# Patient Record
Sex: FEMALE | Race: OTHER | NOT HISPANIC OR LATINO | Employment: FULL TIME | URBAN - METROPOLITAN AREA
[De-identification: names, ages, dates, MRNs, and addresses within clinical notes are randomized per-mention and may not be internally consistent; named-entity substitution may affect disease eponyms.]

---

## 2017-07-17 ENCOUNTER — TELEPHONE (OUTPATIENT)
Dept: OBGYN | Facility: CLINIC | Age: 37
End: 2017-07-17

## 2018-04-17 ENCOUNTER — OFFICE VISIT (OUTPATIENT)
Dept: MEDICAL GROUP | Facility: MEDICAL CENTER | Age: 38
End: 2018-04-17
Payer: COMMERCIAL

## 2018-04-17 VITALS
TEMPERATURE: 97.8 F | RESPIRATION RATE: 16 BRPM | BODY MASS INDEX: 45.48 KG/M2 | WEIGHT: 273 LBS | OXYGEN SATURATION: 97 % | SYSTOLIC BLOOD PRESSURE: 122 MMHG | DIASTOLIC BLOOD PRESSURE: 72 MMHG | HEART RATE: 70 BPM | HEIGHT: 65 IN

## 2018-04-17 DIAGNOSIS — Z00.00 ROUTINE GENERAL MEDICAL EXAMINATION AT A HEALTH CARE FACILITY: ICD-10-CM

## 2018-04-17 DIAGNOSIS — E66.01 MORBID OBESITY WITH BMI OF 45.0-49.9, ADULT (HCC): ICD-10-CM

## 2018-04-17 DIAGNOSIS — Z23 NEED FOR PNEUMOCOCCAL VACCINATION: ICD-10-CM

## 2018-04-17 PROCEDURE — 90732 PPSV23 VACC 2 YRS+ SUBQ/IM: CPT | Performed by: NURSE PRACTITIONER

## 2018-04-17 PROCEDURE — 90471 IMMUNIZATION ADMIN: CPT | Performed by: NURSE PRACTITIONER

## 2018-04-17 PROCEDURE — 99395 PREV VISIT EST AGE 18-39: CPT | Mod: 25 | Performed by: NURSE PRACTITIONER

## 2018-04-17 ASSESSMENT — PATIENT HEALTH QUESTIONNAIRE - PHQ9: CLINICAL INTERPRETATION OF PHQ2 SCORE: 0

## 2018-04-17 NOTE — PROGRESS NOTES
"Subjective:      Ralf Qureshi is a 37 y.o. female who presents with Annual Exam        CC: Patient here today accompanied by her  for yearly physical.    HPI Ralf Qureshi was last seen in this office in 2016. She states she feels generally healthy and although she has history of migraine she rarely uses medicines. She states she has an appointment for her Pap smear in the next few weeks. She has no complaints today. 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 She does continue to smoke as does her . She denies drug or alcohol usage. She has no physical or mental health complaints today.        Current Outpatient Prescriptions   Medication Sig Dispense Refill   • sumatriptan (IMITREX) 50 MG Tab Take 1 Tab by mouth Once PRN for Migraine for up to 1 dose. 10 Tab 3   • ondansetron (ZOFRAN ODT) 4 MG TABLET DISPERSIBLE Take 1 Tab by mouth every 8 hours as needed for Nausea/Vomiting. 10 Tab 0     No current facility-administered medications for this visit.      Social History   Substance Use Topics   • Smoking status: Current Some Day Smoker     Packs/day: 0.25     Years: 15.00     Types: Cigarettes     Last attempt to quit: 10/8/2014   • Smokeless tobacco: Current User      Comment: cut down to 1 cigarette per day   • Alcohol use Yes      Comment: occas     Past Medical History:   Diagnosis Date   • Anemia 2/2011   • Blood transfusion 2/2011 2/2 anemia   • Headache(784.0)    • Migraine      Family History   Problem Relation Age of Onset   • Diabetes Maternal Grandmother      insulin   • Hypertension Maternal Grandmother    • Cancer Maternal Grandfather      lung cancer   • Cancer Mother      lung   • Other Father      unknown       Review of Systems   All other systems reviewed and are negative.         Objective:     /72   Pulse 70   Temp 36.6 °C (97.8 °F)   Resp 16   Ht 1.651 m (5' 5\")   Wt 123.8 kg (273 lb)   SpO2 97%   BMI 45.43 kg/m²      Physical Exam "   Constitutional: She is oriented to person, place, and time. She appears well-developed and well-nourished. No distress.   HENT:   Head: Normocephalic and atraumatic.   Right Ear: External ear normal.   Left Ear: External ear normal.   Nose: Nose normal.   Eyes: Right eye exhibits no discharge. Left eye exhibits no discharge.   Neck: Normal range of motion. Neck supple. No thyromegaly present.   Cardiovascular: Normal rate, regular rhythm and normal heart sounds.  Exam reveals no gallop and no friction rub.    No murmur heard.  Pulmonary/Chest: Effort normal and breath sounds normal. She has no wheezes. She has no rales.   Musculoskeletal: She exhibits no edema or tenderness.   Neurological: She is alert and oriented to person, place, and time. She displays normal reflexes.   Skin: Skin is warm and dry. No rash noted. She is not diaphoretic.   Psychiatric: She has a normal mood and affect. Her behavior is normal. Judgment and thought content normal.   Nursing note and vitals reviewed.              Assessment/Plan:     1. Routine general medical examination at a health care facility  It does not appear the patient has had recent routine lab work which I have ordered today. She will follow up pending results. She is to be sure to have her Pap smear done with gynecology in the near future. She was advised to quit smoking and lose weight.  - CBC WITHOUT DIFFERENTIAL; Future  - COMP METABOLIC PANEL; Future  - LIPID PROFILE; Future  - TSH; Future    2. Morbid obesity with BMI of 45.0-49.9, adult ([facility])  Patient would like to see a dietitian to discuss weight loss and she has trouble losing weight on her own.  - Patient identified as having weight management issue.  Appropriate orders and counseling given.  [facility]) Services Requested: Weight Management Program; Reason for Visit: Overweight/Obesity; Future    3. Need for pneumococcal vaccination  I have placed the below  orders and discussed them with an approved delegating provider. The MA is performing the below orders under the direction of Dr. Wayne    - PNEUMOCOCCAL POLYSACCHARIDE VACCINE 23-VALENT =>3YO SQ/IM

## 2019-01-21 ENCOUNTER — SUPERVISING PHYSICIAN REVIEW (OUTPATIENT)
Dept: MEDICAL GROUP | Facility: MEDICAL CENTER | Age: 39
End: 2019-01-21

## 2019-01-21 ENCOUNTER — OFFICE VISIT (OUTPATIENT)
Dept: MEDICAL GROUP | Facility: MEDICAL CENTER | Age: 39
End: 2019-01-21
Payer: COMMERCIAL

## 2019-01-21 VITALS
SYSTOLIC BLOOD PRESSURE: 122 MMHG | HEIGHT: 65 IN | DIASTOLIC BLOOD PRESSURE: 68 MMHG | HEART RATE: 66 BPM | OXYGEN SATURATION: 98 % | BODY MASS INDEX: 46.15 KG/M2 | WEIGHT: 277 LBS | RESPIRATION RATE: 16 BRPM

## 2019-01-21 DIAGNOSIS — R73.01 IMPAIRED FASTING GLUCOSE: ICD-10-CM

## 2019-01-21 DIAGNOSIS — Z12.2 ENCOUNTER FOR SCREENING FOR LUNG CANCER: ICD-10-CM

## 2019-01-21 DIAGNOSIS — Z72.0 TOBACCO ABUSE: ICD-10-CM

## 2019-01-21 DIAGNOSIS — Z12.39 SCREENING FOR BREAST CANCER: ICD-10-CM

## 2019-01-21 DIAGNOSIS — E78.5 DYSLIPIDEMIA: ICD-10-CM

## 2019-01-21 PROCEDURE — 99213 OFFICE O/P EST LOW 20 MIN: CPT | Performed by: NURSE PRACTITIONER

## 2019-01-21 RX ORDER — IRON 18 MG
TABLET ORAL
COMMUNITY
End: 2021-04-20

## 2019-01-21 ASSESSMENT — PATIENT HEALTH QUESTIONNAIRE - PHQ9: CLINICAL INTERPRETATION OF PHQ2 SCORE: 0

## 2019-01-21 NOTE — PROGRESS NOTES
Subjective:      Ralf Qureshi is a 38 y.o. female who presents with Results (labs)        CC: Patient here today for routine follow-up on recent lab work with elevated glucose and low HDL.    HPI Ralf Qureshi        1. Impaired fasting glucose  Patient's recent fasting blood sugar came back mildly elevated at 102.  She has no history of diabetes and her  was just recently diagnosed with type 2 diabetes.  She does eat a high carb diet and her BMI is elevated.  She also smokes cigarettes.    2. Dyslipidemia  Patient's cholesterol levels came back fairly good except for slightly low HDL at 38.  She states she does not exercise but plans on getting into an exercise program with the new year.      3. Tobacco abuse  Patient states she has been smoking cigarettes since she was a teenager.    4. Screening for breast cancer  Patient due for her first mammogram.    5. Encounter for screening for lung cancer  Patient states a family member recently  of lung cancer and since she is a long-term tobacco user, she would like to be evaluated further.  Social History   Substance Use Topics   • Smoking status: Current Some Day Smoker     Packs/day: 0.25     Years: 15.00     Types: Cigarettes     Last attempt to quit: 10/8/2014   • Smokeless tobacco: Current User      Comment: cut down to 1 cigarette per day   • Alcohol use Yes      Comment: occas     Current Outpatient Prescriptions   Medication Sig Dispense Refill   • Ferrous Sulfate (IRON) 90 (18 Fe) MG Tab Take  by mouth.     • sumatriptan (IMITREX) 50 MG Tab Take 1 Tab by mouth Once PRN for Migraine for up to 1 dose. 10 Tab 3   • ondansetron (ZOFRAN ODT) 4 MG TABLET DISPERSIBLE Take 1 Tab by mouth every 8 hours as needed for Nausea/Vomiting. 10 Tab 0     No current facility-administered medications for this visit.      Past Medical History:   Diagnosis Date   • Anemia 2011   • Blood transfusion 2011 anemia   • Headache(784.0)    • Migraine   "    Family History   Problem Relation Age of Onset   • Diabetes Maternal Grandmother         insulin   • Hypertension Maternal Grandmother    • Cancer Maternal Grandfather         lung cancer   • Cancer Mother         lung   • Other Father         unknown       Review of Systems   All other systems reviewed and are negative.         Objective:     /68 (BP Location: Right arm, Patient Position: Sitting, BP Cuff Size: Adult)   Pulse 66   Resp 16   Ht 1.651 m (5' 5\")   Wt (!) 125.6 kg (277 lb)   SpO2 98%   BMI 46.10 kg/m²      Physical Exam   Constitutional: She is oriented to person, place, and time. She appears well-developed and well-nourished. No distress.   HENT:   Head: Normocephalic and atraumatic.   Right Ear: External ear normal.   Left Ear: External ear normal.   Nose: Nose normal.   Eyes: Right eye exhibits no discharge. Left eye exhibits no discharge.   Neck: Normal range of motion. Neck supple. No thyromegaly present.   Cardiovascular: Normal rate, regular rhythm and normal heart sounds.  Exam reveals no gallop and no friction rub.    No murmur heard.  Pulmonary/Chest: Effort normal and breath sounds normal. She has no wheezes. She has no rales.   Musculoskeletal: She exhibits no edema or tenderness.   Neurological: She is alert and oriented to person, place, and time. She displays normal reflexes.   Skin: Skin is warm and dry. No rash noted. She is not diaphoretic.   Psychiatric: She has a normal mood and affect. Her behavior is normal. Judgment and thought content normal.   Nursing note and vitals reviewed.              Assessment/Plan:     1. Impaired fasting glucose  I reviewed with patient that she is at risk for type 2 diabetes with her morbid obesity and high carb diet.  We discussed ways to help prevent this and also will have her do a hemoglobin A1c before her next visit.  - HEMOGLOBIN A1C; Future  - COMP METABOLIC PANEL; Future    2. Dyslipidemia  Patient's 10-year ASCVD risk is low " at 1-1/2% but she would do better with an improved HDL and she states she is going to be exercising this year.    3. Tobacco abuse  Patient advised to quit smoking.  - REFERRAL TO LUNG CANCER SCREENING PROGRAM    4. Screening for breast cancer  Patient due for first mammogram.  - MA-SCREENING MAMMO BILAT W/TOMOSYNTHESIS W/CAD; Future    5. Encounter for screening for lung cancer  Patient would like to do screening if covered by her insurance.  - REFERRAL TO LUNG CANCER SCREENING PROGRAM

## 2019-01-21 NOTE — PROGRESS NOTES
I have reviewed and/ or discussed the encounter dated today with the mid level provider.   Face to face encounter/direct observation: no    ARMIN Wayne Md.

## 2019-01-23 ENCOUNTER — TELEPHONE (OUTPATIENT)
Dept: HEMATOLOGY ONCOLOGY | Facility: MEDICAL CENTER | Age: 39
End: 2019-01-23

## 2019-01-23 NOTE — TELEPHONE ENCOUNTER
Received referral to lung cancer screening program.  Chart review to assess for lung cancer screening program eligibility.   1. Age 55-77 yrs of age? No 38 y.o.  2. 30 pack year hx of smoking, or greater? No 0.25 [id number] 3.75pkyr hx  3. Current smoker or if quit, has pt quit within last 15 yrs?Yes  Current smoker  4. Any signs or symptoms of lung cancer? None noted  5. Previous history of lung cancer? None noted  6. Chest CT within past 12 mos.? None noted  Patient does not meet eligibility criteria. [facility] scheduling notified to schedule the shared decision making visit.

## 2019-02-05 ENCOUNTER — HOSPITAL ENCOUNTER (OUTPATIENT)
Dept: RADIOLOGY | Facility: MEDICAL CENTER | Age: 39
End: 2019-02-05
Attending: NURSE PRACTITIONER
Payer: COMMERCIAL

## 2019-02-05 DIAGNOSIS — Z12.39 SCREENING FOR BREAST CANCER: ICD-10-CM

## 2019-02-05 PROCEDURE — 77063 BREAST TOMOSYNTHESIS BI: CPT

## 2019-03-19 ENCOUNTER — GYNECOLOGY VISIT (OUTPATIENT)
Dept: OBGYN | Facility: CLINIC | Age: 39
End: 2019-03-19
Payer: COMMERCIAL

## 2019-03-19 ENCOUNTER — HOSPITAL ENCOUNTER (OUTPATIENT)
Facility: MEDICAL CENTER | Age: 39
End: 2019-03-19
Attending: OBSTETRICS & GYNECOLOGY
Payer: COMMERCIAL

## 2019-03-19 VITALS
BODY MASS INDEX: 45.65 KG/M2 | SYSTOLIC BLOOD PRESSURE: 134 MMHG | DIASTOLIC BLOOD PRESSURE: 86 MMHG | HEIGHT: 65 IN | WEIGHT: 274 LBS

## 2019-03-19 DIAGNOSIS — Z01.419 WELL WOMAN EXAM: ICD-10-CM

## 2019-03-19 DIAGNOSIS — T83.32XA MALPOSITIONED IUD, INITIAL ENCOUNTER: ICD-10-CM

## 2019-03-19 DIAGNOSIS — Z12.4 SCREENING FOR CERVICAL CANCER: ICD-10-CM

## 2019-03-19 PROCEDURE — 99386 PREV VISIT NEW AGE 40-64: CPT | Performed by: OBSTETRICS & GYNECOLOGY

## 2019-03-19 PROCEDURE — 87591 N.GONORRHOEAE DNA AMP PROB: CPT

## 2019-03-19 PROCEDURE — 88175 CYTOPATH C/V AUTO FLUID REDO: CPT

## 2019-03-19 PROCEDURE — 87491 CHLMYD TRACH DNA AMP PROBE: CPT

## 2019-03-19 PROCEDURE — 87624 HPV HI-RISK TYP POOLED RSLT: CPT

## 2019-03-19 RX ORDER — MISOPROSTOL 200 UG/1
200 TABLET ORAL ONCE
Qty: 2 TAB | Refills: 0 | Status: SHIPPED | OUTPATIENT
Start: 2019-03-19 | End: 2019-03-19

## 2019-03-19 NOTE — NON-PROVIDER
Pt here for her well woman exam, currently has the IUD since 07/2015  Last mammogram 02/19  Last pap 08/2014  Pharmacy verified

## 2019-03-19 NOTE — PROGRESS NOTES
Well woman visit   New patient (has not been seen since )    Ralf Qureshi is a 38 y.o.  who presents to her Annual Exam.   She reports that she has had the Mirena IUD in place since  and overall she has been happy with the device.  She has random breakthrough spotting but this does not bother her as she used to have very heavy and painful periods.  She also reports that for the past 4-5 months she has noticed sharp pain in the top of her vagina which has no exacerbating or relieving factors.  It is a quick needlelike pain located in the midline.  She wonders if this could be her IUD.  Otherwise denies complaints today, denies abnormal vaginal discharge, breast concerns, dyspareunia, urinary or bowel complaints.    GYN History:  Mirena IUD - has occasional spotting.  Menarche @ 15.  Menses outside of IUD are regular, lasting 5 days, heavy and painful.  Last pap ,  no h/o abnormal pap.  No history of cone biopsy, LEEP or any other cervical, uterine or gynecologic surgery. No history of sexually transmitted diseases.  Currently sexually active with .  Utilizing Mirena for contraception and has used condoms in the past.     OB History:    OB History    Para Term  AB Living   4 2 2   1 2   SAB TAB Ectopic Molar Multiple Live Births   1         2      # Outcome Date GA Lbr Clint/2nd Weight Sex Delivery Anes PTL Lv   4 Term 12 40w0d  3.515 kg (7 lb 12 oz) M Vag-Spont  N PRISCILLA      Birth Comments: no complications   3 SAB            2 Term 05 39w6d  3.515 kg (7 lb 12 oz) F Vag-Spont EPI N PRISCILLA      Birth Comments: Denies complications.   1                    Health Maintenance:  Last mammogram:  This year  Immunizations: up to date    Review of Systems:   Pertinent positives documented in HPI and all other systems reviewed & are negative.    All PMH, PSH, allergies, social history and FH reviewed and updated today:  Past Medical History:  Past Medical History:  "  Diagnosis Date   • Anemia 2/2011   • Blood transfusion 2/2011 2/2 anemia   • Headache(784.0)    • Migraine      Past Surgical History:  Past Surgical History:   Procedure Laterality Date   • BEAR BY LAPAROSCOPY  2/15/2011    Performed by LALITA ALANIS at SURGERY [facility] ORS   • DILATION AND CURETTAGE  2006     Medications:   Current Outpatient Prescriptions Ordered in [facility]   Medication Sig Dispense Refill   • miSOPROStol (CYTOTEC) 200 MCG Tab Take 1 Tab by mouth Once for 1 dose. 2 Tab 0   • Ferrous Sulfate (IRON) 90 (18 Fe) MG Tab Take  by mouth.       No current Epic-ordered facility-administered medications on file.        Allergies: Patient has no known allergies.    Social History:  Social History     Social History   • Marital status:      Spouse name: N/A   • Number of children: N/A   • Years of education: N/A     Social History Main Topics   • Smoking status: Current Some Day Smoker     Packs/day: 0.25     Years: 15.00     Types: Cigarettes     Last attempt to quit: 10/8/2014   • Smokeless tobacco: Current User      Comment: cut down to 1 cigarette per day   • Alcohol use Yes      Comment: occas   • Drug use: No   • Sexual activity: Yes     Partners: Male      Comment: unplanned pregnancy, FOB involved     Other Topics Concern   • Not on file     Social History Narrative   • No narrative on file       Family History:  Family History   Problem Relation Age of Onset   • Diabetes Maternal Grandmother         insulin   • Hypertension Maternal Grandmother    • Cancer Maternal Grandfather         lung cancer   • Cancer Mother         lung   • Other Father         unknown      Objective:   Vitals:  Blood pressure 134/86, height 1.651 m (5' 5\"), weight 124.3 kg (274 lb), not currently breastfeeding.  Body mass index is 45.6 kg/m². (Goal BM I>18 <25)    Physical Exam:   Nursing note and vitals reviewed.  Physical Exam   Constitutional: She is oriented to person, place, and time and well-developed, " well-nourished, and in no distress.   HENT:   Head: Normocephalic and atraumatic.   Eyes: Pupils are equal, round, and reactive to light.   Neck: Normal range of motion. No thyromegaly present.   Cardiovascular: Intact distal pulses.    Pulmonary/Chest: Effort normal. Right breast exhibits no inverted nipple, no mass, no nipple discharge, no skin change and no tenderness. Left breast exhibits no inverted nipple, no mass, no nipple discharge, no skin change and no tenderness.   Abdominal: Soft.   Musculoskeletal: Normal range of motion.   Neurological: She is alert and oriented to person, place, and time.   Skin: Skin is warm and dry.   Psychiatric: Mood, memory, affect and judgment normal.     Genitourinary:  Normal appearing external female genitalia without any rashes, lesions, labial fusion or tenderness.  Vagina is pink moist and well rugated. Physiologic discharge present within vaginal vault.  Cervix well visualized with approximately 5 mm of stem of IUD extruding from external cervical loss.  Office is extruding to patient's right and is unable to be removed when grasped with a ring forcep.  Significant resistance is noted.  Pap is obtained.  On bimanual exam there is no cervical motion tenderness, IUD is not tender with manipulation but also not able to be moved.  Remainder bimanual exam is limited by body habitus.     Assessment/Plan:     1. Well woman exam  THINPREP PAP W/HPV AND CTNG   2. Screening for cervical cancer  THINPREP PAP W/HPV AND CTNG   3. Malpositioned IUD, initial encounter  US-PELVIC COMPLETE (TRANSABDOMINAL/TRANSVAGINAL) (COMBO)    miSOPROStol (CYTOTEC) 200 MCG Tab       Ralf Qureshi is a 38 y.o.  female who presents for her annual gynecologic exam.   # Preventative health care:   --Breast self awareness discussed. Mammogram done this year (annually starting at age 40).  --Cervical cancer screening. Last Pap 5 years ago. Collected today. HPV also sent. I will follow up with  patient once results are back as per ASCCP guidelines.   --Smoking cessation is advised.    --Immunizations are up to date.  --Diet, exercise, vitamin supplementation, and hydration discussed.  #Malpositioned IUD.  Discussed this with patient today.  She likes the IUD device and would like this when removed and another replaced if possible.  Transvaginal ultrasound is ordered for further diagnosis of how misplaced this IUD is.  If it is significantly embedded in the cervix will need to proceed to the OR for removal.  Will attempt removal in clinic otherwise and have written a prescription for patient to take misoprostol prior to this clinic appointment.  Instructed to take it intravaginally about 4 hours prior to appointment.  Prior authorization for removal and replacement placed today.  She is counseled about using a backup method.  # Obesity: Diet and exercise discussed as well as finding lifestyle habits that work for patient.  # Follow up for management of malpositioned IUD.

## 2019-03-20 DIAGNOSIS — Z12.4 SCREENING FOR CERVICAL CANCER: ICD-10-CM

## 2019-03-20 DIAGNOSIS — Z01.419 WELL WOMAN EXAM: ICD-10-CM

## 2019-03-22 LAB
C TRACH DNA GENITAL QL NAA+PROBE: NEGATIVE
CYTOLOGY REG CYTOL: NORMAL
HPV HR 12 DNA CVX QL NAA+PROBE: NEGATIVE
HPV16 DNA SPEC QL NAA+PROBE: NEGATIVE
HPV18 DNA SPEC QL NAA+PROBE: NEGATIVE
N GONORRHOEA DNA GENITAL QL NAA+PROBE: NEGATIVE
SPECIMEN SOURCE: NORMAL
SPECIMEN SOURCE: NORMAL

## 2019-04-02 ENCOUNTER — APPOINTMENT (OUTPATIENT)
Dept: RADIOLOGY | Facility: MEDICAL CENTER | Age: 39
End: 2019-04-02
Attending: OBSTETRICS & GYNECOLOGY
Payer: COMMERCIAL

## 2019-10-07 ENCOUNTER — APPOINTMENT (OUTPATIENT)
Dept: MEDICAL GROUP | Facility: MEDICAL CENTER | Age: 39
End: 2019-10-07
Payer: COMMERCIAL

## 2020-01-06 ENCOUNTER — OFFICE VISIT (OUTPATIENT)
Dept: MEDICAL GROUP | Facility: MEDICAL CENTER | Age: 40
End: 2020-01-06
Payer: COMMERCIAL

## 2020-01-06 VITALS
BODY MASS INDEX: 45.32 KG/M2 | WEIGHT: 272 LBS | HEART RATE: 87 BPM | RESPIRATION RATE: 16 BRPM | SYSTOLIC BLOOD PRESSURE: 124 MMHG | DIASTOLIC BLOOD PRESSURE: 76 MMHG | OXYGEN SATURATION: 99 % | HEIGHT: 65 IN

## 2020-01-06 DIAGNOSIS — G47.30 SLEEP DISORDER BREATHING: ICD-10-CM

## 2020-01-06 DIAGNOSIS — G44.89 OTHER HEADACHE SYNDROME: ICD-10-CM

## 2020-01-06 DIAGNOSIS — R73.09 BLOOD GLUCOSE ABNORMAL: ICD-10-CM

## 2020-01-06 DIAGNOSIS — Z12.31 ENCOUNTER FOR SCREENING MAMMOGRAM FOR BREAST CANCER: ICD-10-CM

## 2020-01-06 DIAGNOSIS — Z00.00 ROUTINE GENERAL MEDICAL EXAMINATION AT A HEALTH CARE FACILITY: ICD-10-CM

## 2020-01-06 DIAGNOSIS — E66.01 MORBID OBESITY WITH BMI OF 45.0-49.9, ADULT (HCC): ICD-10-CM

## 2020-01-06 LAB
HBA1C MFR BLD: 5.3 % (ref 0–5.6)
INT CON NEG: NEGATIVE
INT CON POS: POSITIVE

## 2020-01-06 PROCEDURE — 83036 HEMOGLOBIN GLYCOSYLATED A1C: CPT | Performed by: NURSE PRACTITIONER

## 2020-01-06 PROCEDURE — 99214 OFFICE O/P EST MOD 30 MIN: CPT | Performed by: NURSE PRACTITIONER

## 2020-01-06 RX ORDER — SUMATRIPTAN 50 MG/1
50 TABLET, FILM COATED ORAL
Qty: 10 TAB | Refills: 3 | Status: SHIPPED | OUTPATIENT
Start: 2020-01-06 | End: 2020-06-22

## 2020-01-06 ASSESSMENT — ENCOUNTER SYMPTOMS
HEADACHES: 1
INSOMNIA: 1

## 2020-01-06 ASSESSMENT — PATIENT HEALTH QUESTIONNAIRE - PHQ9: CLINICAL INTERPRETATION OF PHQ2 SCORE: 0

## 2020-01-06 NOTE — PROGRESS NOTES
Subjective:      Ralf Qureshi is a 39 y.o. female who presents with Migraine and Other (sleep apnea concerns)        CC: Patient is here today for issues including headaches and possible sleep apnea.    HPI         1. Other headache syndrome  Patient states she was diagnosed with migraines about 10 years ago and typically they would only occur about once or twice per week and she would treat with over-the-counter medicines.  For the past few days they have been daily and over-the-counter medicine has not been helping.  It is a pain mostly on the front of her forehead and there is no aura.  She denies thunderclap-like feeling.  She thinks it is related to sleep apnea which will be addressed below.  She has no nausea or vomiting.    2. Sleep disorder breathing  Patient's  states that patient has been having much snoring at night which has been worsening.  Her  has sleep apnea and thinks she has similar symptoms.  She does complain of daytime somnolence and headaches.  She is morbidly obese.    3. Blood glucose abnormal  Previous lab work showed mild elevations in blood sugar but she did not complete her lab work ordered last year.    4. Encounter for screening mammogram for breast cancer  Patient due for mammogram    5. Morbid obesity with BMI of 45.0-49.9, adult (HCC)  Patient showing increasing weight gain and now has possible sleep apnea and would be willing to try a structured clinic to help her lose weight    6. Routine general medical examination at a health care facility  Patient due for routine blood work  Past Medical History:   Diagnosis Date   • Anemia 2/2011   • Blood transfusion 2/2011 2/2 anemia   • Headache(784.0)    • Migraine      Social History     Socioeconomic History   • Marital status:      Spouse name: Not on file   • Number of children: Not on file   • Years of education: Not on file   • Highest education level: Not on file   Occupational History   • Not on file    Social Needs   • Financial resource strain: Not on file   • Food insecurity:     Worry: Not on file     Inability: Not on file   • Transportation needs:     Medical: Not on file     Non-medical: Not on file   Tobacco Use   • Smoking status: Current Some Day Smoker     Packs/day: 0.25     Years: 15.00     Pack years: 3.75     Types: Cigarettes     Last attempt to quit: 10/8/2014     Years since quittin.2   • Smokeless tobacco: Current User   • Tobacco comment: cut down to 1 cigarette per day   Substance and Sexual Activity   • Alcohol use: Yes     Comment: occas   • Drug use: No   • Sexual activity: Yes     Partners: Male     Comment: unplanned pregnancy, FOB involved   Lifestyle   • Physical activity:     Days per week: Not on file     Minutes per session: Not on file   • Stress: Not on file   Relationships   • Social connections:     Talks on phone: Not on file     Gets together: Not on file     Attends Muslim service: Not on file     Active member of club or organization: Not on file     Attends meetings of clubs or organizations: Not on file     Relationship status: Not on file   • Intimate partner violence:     Fear of current or ex partner: Not on file     Emotionally abused: Not on file     Physically abused: Not on file     Forced sexual activity: Not on file   Other Topics Concern   • Not on file   Social History Narrative   • Not on file     Current Outpatient Medications   Medication Sig Dispense Refill   • SUMAtriptan (IMITREX) 50 MG Tab Take 1 Tab by mouth Once PRN for Migraine for up to 1 dose. 10 Tab 3   • Ferrous Sulfate (IRON) 90 (18 Fe) MG Tab Take  by mouth.       No current facility-administered medications for this visit.      Family History   Problem Relation Age of Onset   • Diabetes Maternal Grandmother         insulin   • Hypertension Maternal Grandmother    • Cancer Maternal Grandfather         lung cancer   • Cancer Mother         lung   • Other Father         unknown       Review  "of Systems   Constitutional: Positive for malaise/fatigue.   Neurological: Positive for headaches.   Psychiatric/Behavioral: The patient has insomnia.    All other systems reviewed and are negative.         Objective:     /76 (BP Location: Left arm, Patient Position: Sitting, BP Cuff Size: Adult)   Pulse 87   Resp 16   Ht 1.651 m (5' 5\")   Wt 123.4 kg (272 lb)   SpO2 99%   BMI 45.26 kg/m²      Physical Exam  Vitals signs and nursing note reviewed.   Constitutional:       General: She is not in acute distress.     Appearance: She is well-developed. She is not diaphoretic.   HENT:      Head: Normocephalic and atraumatic.      Right Ear: External ear normal.      Left Ear: External ear normal.      Nose: Nose normal.   Eyes:      General:         Right eye: No discharge.         Left eye: No discharge.   Neck:      Musculoskeletal: Normal range of motion and neck supple.      Thyroid: No thyromegaly.   Cardiovascular:      Rate and Rhythm: Normal rate and regular rhythm.      Heart sounds: Normal heart sounds. No murmur. No friction rub. No gallop.    Pulmonary:      Effort: Pulmonary effort is normal.      Breath sounds: Normal breath sounds. No wheezing or rales.   Musculoskeletal:         General: No tenderness.   Skin:     General: Skin is warm and dry.      Findings: No rash.   Neurological:      Mental Status: She is alert and oriented to person, place, and time.      Deep Tendon Reflexes: Reflexes normal.   Psychiatric:         Behavior: Behavior normal.         Thought Content: Thought content normal.         Judgment: Judgment normal.                 Assessment/Plan:       1. Other headache syndrome  Patient has history of migraines and feels they may be worsening due to sleep apnea.  She will be sent for a sleep evaluation and I will give her Imitrex to use when the headaches are severe, otherwise she can use over-the-counter medicines again.  She will go to the ER should she develop " thunderclap-like feelings or worsening symptoms.  - SUMAtriptan (IMITREX) 50 MG Tab; Take 1 Tab by mouth Once PRN for Migraine for up to 1 dose.  Dispense: 10 Tab; Refill: 3    2. Sleep disorder breathing  Patient has daytime somnolence as well as loud snoring at night and poor sleep and needs a possible sleep study  - REFERRAL TO SLEEP STUDIES    3. Blood glucose abnormal  Blood sugars in the past been mildly elevated but her hemoglobin A1c today comes back good at 5.3  - POCT  A1C    4. Encounter for screening mammogram for breast cancer    - MA-SCREEN MAMMO W/CAD-BILAT; Future    5. Morbid obesity with BMI of 45.0-49.9, adult (HCC)    - Patient identified as having weight management issue.  Appropriate orders and counseling given.  - REFERRAL TO [facility] HEALTH IMPROVEMENT PROGRAMS (HIP) Services Requested: Physician Medical Weight Management Program; Reason for Referral? BMI>30; Reason for Visit: Overweight/Obesity    6. Routine general medical examination at a health care facility    - CBC WITH DIFFERENTIAL; Future  - IRON/TOTAL IRON BIND; Future  - Lipid Profile; Future  - Comp Metabolic Panel; Future  - TSH; Future

## 2020-01-08 ENCOUNTER — SLEEP CENTER VISIT (OUTPATIENT)
Dept: SLEEP MEDICINE | Facility: MEDICAL CENTER | Age: 40
End: 2020-01-08
Payer: COMMERCIAL

## 2020-01-08 VITALS
SYSTOLIC BLOOD PRESSURE: 108 MMHG | HEIGHT: 65 IN | BODY MASS INDEX: 45.15 KG/M2 | WEIGHT: 271 LBS | RESPIRATION RATE: 16 BRPM | DIASTOLIC BLOOD PRESSURE: 82 MMHG | HEART RATE: 72 BPM | OXYGEN SATURATION: 98 %

## 2020-01-08 DIAGNOSIS — E66.01 MORBID OBESITY WITH BMI OF 45.0-49.9, ADULT (HCC): ICD-10-CM

## 2020-01-08 DIAGNOSIS — G47.33 OSA (OBSTRUCTIVE SLEEP APNEA): ICD-10-CM

## 2020-01-08 DIAGNOSIS — Z72.0 TOBACCO ABUSE: ICD-10-CM

## 2020-01-08 DIAGNOSIS — Z23 NEED FOR INFLUENZA VACCINATION: ICD-10-CM

## 2020-01-08 PROBLEM — R51 HEADACHE(784.0): Status: ACTIVE | Noted: 2020-01-08

## 2020-01-08 PROCEDURE — 99204 OFFICE O/P NEW MOD 45 MIN: CPT | Performed by: INTERNAL MEDICINE

## 2020-01-08 RX ORDER — ZOLPIDEM TARTRATE 5 MG/1
5 TABLET ORAL NIGHTLY PRN
Qty: 2 TAB | Refills: 0 | Status: SHIPPED | OUTPATIENT
Start: 2020-01-08 | End: 2020-02-08

## 2020-01-08 NOTE — PROGRESS NOTES
CC: evaluation of zahra    HPI:  Ms. 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  Mr. Murillo was able to elicit a history of progressive snoring, daytime somnolence, headaches, and obesity.  Her  evidently has sleep apnea and believes that she has the same issue.      Symptom Summary:  Snoring: +  Very loud snoring: +  Witnessed apneas: +  Resuscitative snorts: +  Nocturnal shortness of breath: -  Non-restorative sleep: +  Insomnia: +  Nocturnal awakenings: + times 2  Nocturia: +  EDS: +  Fatigue: +  Tiredness: +  Falls asleep accidentally: + watching TV or working on a laptop  Napping or returning to bed after arising: +  Restless legs: + massage or getting out of bed relieves  Limb movements during sleep: -  Nocturnal headaches: +    Significant comorbidities modifying factors include morbid obesity, abnormal fasting glucose, headache, smoker, and need for influenza vaccination.    Patient Active Problem List    Diagnosis Date Noted   • ZAHRA (obstructive sleep apnea) 01/08/2020   • Headache(784.0) 01/08/2020   • Tobacco abuse 01/21/2019   • Morbid obesity with BMI of 45.0-49.9, adult ([facility]) 04/17/2018   • Family history of diabetes mellitus 02/09/2011   • Anemia 02/01/2011       Past Medical History:   Diagnosis Date   • Anemia 2/2011   • Blood transfusion 2/2011 2/2 anemia   • Headache(784.0)    • Migraine         Past Surgical History:   Procedure Laterality Date   • BEAR BY LAPAROSCOPY  2/15/2011    Performed by LALITA ALANIS at SURGERY [facility] ORS   • DILATION AND CURETTAGE  2006       Family History   Problem Relation Age of Onset   • Diabetes Maternal Grandmother         insulin   • Hypertension Maternal Grandmother    • Cancer Maternal Grandfather         lung cancer   • Cancer Mother         lung   • Other Father     unknown       Social History     Socioeconomic History   • Marital status:      Spouse name: Not on file   • Number of children: Not on file   • Years of education: Not on file   • Highest education level: Not on file   Occupational History   • Not on file   Social Needs   • Financial resource strain: Not on file   • Food insecurity:     Worry: Not on file     Inability: Not on file   • Transportation needs:     Medical: Not on file     Non-medical: Not on file   Tobacco Use   • Smoking status: Current Some Day Smoker     Packs/day: 0.25     Years: 15.00     Pack years: 3.75     Types: Cigarettes     Last attempt to quit: 10/8/2014     Years since quittin.2   • Smokeless tobacco: Current User   • Tobacco comment: cut down to 1 cigarette per day   Substance and Sexual Activity   • Alcohol use: Yes     Comment: occas   • Drug use: No   • Sexual activity: Yes     Partners: Male     Comment: unplanned pregnancy, FOB involved   Lifestyle   • Physical activity:     Days per week: Not on file     Minutes per session: Not on file   • Stress: Not on file   Relationships   • Social connections:     Talks on phone: Not on file     Gets together: Not on file     Attends Confucianist service: Not on file     Active member of club or organization: Not on file     Attends meetings of clubs or organizations: Not on file     Relationship status: Not on file   • Intimate partner violence:     Fear of current or ex partner: Not on file     Emotionally abused: Not on file     Physically abused: Not on file     Forced sexual activity: Not on file   Other Topics Concern   • Not on file   Social History Narrative   • Not on file       Current Outpatient Medications   Medication Sig Dispense Refill   • zolpidem (AMBIEN) 5 MG Tab Take 1 Tab by mouth at bedtime as needed for Sleep (1 to 2 po qhs prn insomnia/sleep study. Bring to sleep study.) for up to 2 doses. 2 Tab 0   • SUMAtriptan (IMITREX) 50 MG Tab Take 1 Tab by mouth Once PRN  "for Migraine for up to 1 dose. 10 Tab 3   • Ferrous Sulfate (IRON) 90 (18 Fe) MG Tab Take  by mouth.       No current facility-administered medications for this visit.     \"CURRENT RX\"    ALLERGIES: Patient has no known allergies.    ROS    Constitutional: Denies fever, chills, sweats,  weight loss, fatigue.  Eyes: Denies vision loss, pain, drainage, double vision, wears glasses.  Ears/Nose/Mouth/Throat: Denies earache, difficulty hearing, rhinitis/nasal congestion, injury, recurrent sore throat, persistent hoarseness, decayed teeth/toothaches, ringing or buzzing in the ears.  Cardiovascular: Denies chest pain, tightness, palpitations, swelling in legs/feet, fainting, difficulty breathing when lying down but gets better when sitting up.   Respiratory: Denies shortness of breath, cough, sputum, wheezing, painful breathing, coughing up blood.   Sleep: per HPI  Gastrointestinal: Denies  difficulty swallowing, nausea, abdominal pain, diarrhea, constipation, heartburn.  Genitourinary: Denies  blood in urine, discharge, frequent urination.   Musculoskeletal: Denies painful joints, sore muscles, back pain.   Integumentary: Denies rashes, lumps, color changes.   Neurological: Denies frequent headaches,weakness, dizziness.    PHYSICAL EXAM  Obese    /82 (BP Location: Right arm, Patient Position: Sitting, BP Cuff Size: Large adult)   Pulse 72   Resp 16   Ht 1.651 m (5' 5\")   Wt 122.9 kg (271 lb)   SpO2 98%   BMI 45.10 kg/m²   Appearance: Well-nourished, well-developed, no acute distress  Eyes:  PERRLA, EOMI; wears glasses  ENMT: without lesions, deformities;hearing grossly intact; tongue normal, posterior pharynx without erythema or exudate;   Modified Mallampati (AKA [place]) Score:  Neck: Supple, trachea midline, no masses  Respiratory effort:  No intercostal retractions or use of accessory muscles  Lung auscultation:  No wheezes rhonchi rubs or rales  Cardiac: No murmurs, rubs, or gallops; regular rhythm, " normal rate; no edema  Abdomen:  No tenderness, no organomegaly.  Obese  Musculoskeletal:  Grossly normal; gait and station normal; digits and nails normal  Skin:  No rashes, petechiae, cyanosis  Neurologic: without focal signs; oriented to person, time, place, and purpose; judgement intact  Psychiatric:  No depression, anxiety, agitation        PROBLEMS:  1. ZAHRA (obstructive sleep apnea)    - zolpidem (AMBIEN) 5 MG Tab; Take 1 Tab by mouth at bedtime as needed for Sleep (1 to 2 po qhs prn insomnia/sleep study. Bring to sleep study.) for up to 2 doses.  Dispense: 2 Tab; Refill: 0  - Polysomnography, 4 or More; Future    2. Morbid obesity with BMI of 45.0-49.9, adult ([facility])    - OBESITY COUNSELING (No Charge): Patient identified as having weight management issue.  Appropriate orders and counseling given.    3. Tobacco abuse      4. Need for influenza vaccination        PLAN:   The patient has signs and symptoms consistent with obstructive sleep apnea hypopnea syndrome. Will schedule to have a nocturnal polysomnogram using zolpidem to assist with sleep onset and maintenance should the need arise. Will return after the results are available to determine further diagnostic needs and/or treatment options.    The risks of untreated sleep apnea were discussed with the patient at length. Patients with ZAHRA are at increased risk of cardiovascular disease including coronary artery disease, systemic arterial hypertension, pulmonary arterial hypertension, cardiac arrythmias, and stroke. ZAHRA patients have an increased risk of motor vehicle accidents, type 2 diabetes, chronic kidney disease, and non-alcoholic liver disease. The patient was advised to avoid driving a motor vehicle when drowsy.    Positive airway pressure, such as CPAP, is considered first-line and preferred therapy for sleep apnea and may reverse both symptoms and risks.    Have advised the patient to follow up with the appropriate healthcare practitioners for all  other medical problems and issues.    Return for after sleep study.

## 2020-01-22 ENCOUNTER — TELEPHONE (OUTPATIENT)
Dept: SLEEP MEDICINE | Facility: MEDICAL CENTER | Age: 40
End: 2020-01-22

## 2020-01-22 DIAGNOSIS — G47.33 OSA (OBSTRUCTIVE SLEEP APNEA): ICD-10-CM

## 2020-01-22 NOTE — TELEPHONE ENCOUNTER
I submitted the auth for this patient’s sleep study.  It will be denied.  She doesn’t meet [facility] criteria for an attended sleep study.  I still submitted it to get the formal denial incase the provider would like to do a peer to peer when it is denied.    Otherwise we can submit for an HST.

## 2020-01-28 ENCOUNTER — GYNECOLOGY VISIT (OUTPATIENT)
Dept: OBGYN | Facility: CLINIC | Age: 40
End: 2020-01-28
Payer: COMMERCIAL

## 2020-01-28 DIAGNOSIS — Z30.433 ENCOUNTER FOR IUD REMOVAL AND REINSERTION: ICD-10-CM

## 2020-01-28 LAB
INT CON NEG: NEGATIVE
INT CON POS: POSITIVE
POC URINE PREGNANCY TEST: NEGATIVE

## 2020-01-28 PROCEDURE — 58300 INSERT INTRAUTERINE DEVICE: CPT | Performed by: OBSTETRICS & GYNECOLOGY

## 2020-01-28 PROCEDURE — 81025 URINE PREGNANCY TEST: CPT | Performed by: OBSTETRICS & GYNECOLOGY

## 2020-01-28 PROCEDURE — 58301 REMOVE INTRAUTERINE DEVICE: CPT | Performed by: OBSTETRICS & GYNECOLOGY

## 2020-01-28 NOTE — PROGRESS NOTES
Removal of malpositioned Mirena IUD and replacement with new Mirena.  Please see procedure note for details.

## 2020-01-28 NOTE — PROCEDURES
The patient presents today for removal of malpositioned Mirena IUD and replacement with new Mirena IUD.  She was instructed to obtain a pelvic ultrasound, and take Cytotec 4 hours prior to this appointment.  She did not obtain the ultrasound, nor take the Cytotec.    We discussed that I will attempt to remove the IUD, but if it is embedded, we will discontinue the procedure and proceed with removal and reinsertion in the operating room.    PROCEDURE NOTE: IUD REMOVAL    Informed consent was obtained.    A speculum was placed into the vagina for visualization of the cervix.  The cervix was prepped with Betadine x3.  A 3 mm section of the distal end of the IUD was visible.  There were no strings on the IUD.  The IUD was grasped with a [place] clamp, and the intact IUD was removed, shown to the patient, and discarded.  Moderate resistance was met with removal of the IUD.  Upon inspection of the removed IUD, no strings were present.  The patient tolerated the procedure well.  There were no complications.      Today the patient is counseled on the risks of IUD insertion. Specifically discussed were alternative forms of birth control. I also discussed with the patient the risk of infection on insertion, and had asked the patient to remain on pelvic rest for one week following the insertion. We also discussed the risk of IUD expulsion, the risk of uterine perforation and IUD migration. If the IUD does migrate the patient may require further testing and a separate procedure such as a laparoscopy to retrieve the migrated IUD. I also discussed the 1% risk of pregnancy with IUD use. I also discussed the side effects of possible amenorrhea or irregular bleeding with the Mirena IUD, or heavy, painful menses with the copper IUD.The patient was given the opportunity to ask questions regarding insertion, risks and benefits, all questions are answered in their entirety.  Informed consent is signed.    Procedure note  Urine pregnancy  test is negative, informed consent was previously signed.  The bimanual exam is performed the uterus is noted to be 8 weeks in size and is mid position.  A speculum was inserted into the vagina, the cervix was cleansed with Betadine swabs x3.  A tenaculum was placed on the anterior lip of the cervix  The uterus was sounded to 8 centimeters  The Mirena IUD was placed under sterile conditions.  The strings were trimmed to approximately 3 cm.  The tenaculum was removed from the cervix and hemostasis was achieved with silver nitrate.  The patient tolerated the procedure well.    The patient is asked to followup in 4 weeks for IUD check. The patient is asked to remain on pelvic rest for one week. She is asked to return sooner than 4 weeks for heavy vaginal bleeding uncontrolled pain or fever.

## 2020-02-03 ENCOUNTER — APPOINTMENT (OUTPATIENT)
Dept: MEDICAL GROUP | Facility: MEDICAL CENTER | Age: 40
End: 2020-02-03
Payer: COMMERCIAL

## 2020-02-04 ENCOUNTER — HOME STUDY (OUTPATIENT)
Dept: SLEEP MEDICINE | Facility: MEDICAL CENTER | Age: 40
End: 2020-02-04
Attending: NURSE PRACTITIONER
Payer: COMMERCIAL

## 2020-02-04 DIAGNOSIS — G47.33 OSA (OBSTRUCTIVE SLEEP APNEA): ICD-10-CM

## 2020-02-04 PROCEDURE — 95806 SLEEP STUDY UNATT&RESP EFFT: CPT | Performed by: INTERNAL MEDICINE

## 2020-02-05 NOTE — PROCEDURES
The patient is a 39-year-old female with snoring and daytime hypersomnolence.  Home polysomnography requested for evaluation of suspected ZAHRA.  She has no contraindications to home sleep study monitoring.    A total recording time of 470 minutes was obtained with good-quality data noted.  The patient slept supine throughout testing.      There were 424 snore episodes noted associated with obstructive apneas and hypopneas.  The overall IBETH was 25/h associated with desaturations to a ambar of 75% SPO2.  She spent 18 minutes below SPO2 of 90%.  Mean heart rate was 70 bpm.    Interpretation  Moderate ZAHRA, IBETH 25/h with desaturations to 75% SPO2.    Recommendations:  Treatment options for moderate ZAHRA include CPAP, an oral appliance, potentially UPPP.  The patient should follow-up in the sleep clinic to discuss appropriate therapy.  In general weight loss, as well as avoidance of alcohol, sedatives and muscle relaxants, can be of added benefit.

## 2020-02-25 ENCOUNTER — SLEEP CENTER VISIT (OUTPATIENT)
Dept: SLEEP MEDICINE | Facility: MEDICAL CENTER | Age: 40
End: 2020-02-25
Payer: COMMERCIAL

## 2020-02-25 VITALS
OXYGEN SATURATION: 95 % | HEART RATE: 70 BPM | HEIGHT: 65 IN | RESPIRATION RATE: 16 BRPM | WEIGHT: 276 LBS | BODY MASS INDEX: 45.98 KG/M2 | SYSTOLIC BLOOD PRESSURE: 120 MMHG | DIASTOLIC BLOOD PRESSURE: 70 MMHG

## 2020-02-25 DIAGNOSIS — E66.01 MORBID OBESITY WITH BMI OF 45.0-49.9, ADULT (HCC): ICD-10-CM

## 2020-02-25 DIAGNOSIS — G47.33 OSA (OBSTRUCTIVE SLEEP APNEA): ICD-10-CM

## 2020-02-25 DIAGNOSIS — F17.200 CURRENT SMOKER: ICD-10-CM

## 2020-02-25 PROCEDURE — 99214 OFFICE O/P EST MOD 30 MIN: CPT | Performed by: NURSE PRACTITIONER

## 2020-02-25 NOTE — PATIENT INSTRUCTIONS
Patient understands they may have mask fits within first 30 days of therapy covered by insurance to obtain best fit.    Patient understands the need to use device every night for >4hrs to meet compliance standards for insurance purposes.

## 2020-02-25 NOTE — PROGRESS NOTES
Chief Complaint   Patient presents with   • Results     SS       HPI:  Ralf Qureshi is a 39 y.o. year old female here today for follow-up on sleep study results.  Last OV 1/8/2020 with Dr. Kaiser.     Comorbidities consist of current smoker, headaches and obesity.  BMI 45.    Sleep symptoms consist of snoring, very loud snoring, witnessed apneas, resuscitative snorts, nonrestorative sleep, insomnia, waking multiple times per night, nocturia, fatigue, tiredness, falling asleep while watching TV or working on laptop, napping returning to bed after rising, restless legs relieved by massage or getting up, and nocturnal headaches.    Home sleep study 2/4/2020 noting overall IBETH 25/h with an O2 ambar of 75%.  Patient was less than 90% for 18 minutes of study.  Mean heart rate was 70 bpm.  This notes moderate obstructive sleep apnea and recommendation is in lab titration, versus auto CPAP, UPPP, dental appliance or weight loss.  I reviewed findings with patient.  She would like to try auto CPAP.  She denies cardiac or respiratory symptoms today.  No pedal edema.  She denies a history of sinus issues.  No GERD.      ROS: As per HPI and otherwise negative if not stated.    Past Medical History:   Diagnosis Date   • Anemia 2/2011   • Blood transfusion 2/2011 2/2 anemia   • Headache(784.0)    • Migraine        Past Surgical History:   Procedure Laterality Date   • BEAR BY LAPAROSCOPY  2/15/2011    Performed by LALITA ALANIS at SURGERY [facility] ORS   • DILATION AND CURETTAGE  2006       Family History   Problem Relation Age of Onset   • Diabetes Maternal Grandmother         insulin   • Hypertension Maternal Grandmother    • Cancer Maternal Grandfather         lung cancer   • Cancer Mother         lung   • Other Father         unknown       Social History     Socioeconomic History   • Marital status:      Spouse name: Not on file   • Number of children: Not on file   • Years of education: Not on file   •  "Highest education level: Not on file   Occupational History   • Not on file   Social Needs   • Financial resource strain: Not on file   • Food insecurity     Worry: Not on file     Inability: Not on file   • Transportation needs     Medical: Not on file     Non-medical: Not on file   Tobacco Use   • Smoking status: Current Some Day Smoker     Packs/day: 0.25     Years: 15.00     Pack years: 3.75     Types: Cigarettes     Last attempt to quit: 10/8/2014     Years since quittin.3   • Smokeless tobacco: Current User   • Tobacco comment: cut down to 1 cigarette per day   Substance and Sexual Activity   • Alcohol use: Yes     Comment: occas   • Drug use: No   • Sexual activity: Yes     Partners: Male     Comment: unplanned pregnancy, FOB involved   Lifestyle   • Physical activity     Days per week: Not on file     Minutes per session: Not on file   • Stress: Not on file   Relationships   • Social connections     Talks on phone: Not on file     Gets together: Not on file     Attends Alevism service: Not on file     Active member of club or organization: Not on file     Attends meetings of clubs or organizations: Not on file     Relationship status: Not on file   • Intimate partner violence     Fear of current or ex partner: Not on file     Emotionally abused: Not on file     Physically abused: Not on file     Forced sexual activity: Not on file   Other Topics Concern   • Not on file   Social History Narrative   • Not on file       Allergies as of 2020   • (No Known Allergies)        Vitals:  /70 (BP Location: Left arm, Patient Position: Sitting, BP Cuff Size: Adult)   Pulse 70   Resp 16   Ht 1.651 m (5' 5\")   Wt (!) 125.2 kg (276 lb)   SpO2 95%     Current medications as of today   Current Outpatient Medications   Medication Sig Dispense Refill   • SUMAtriptan (IMITREX) 50 MG Tab Take 1 Tab by mouth Once PRN for Migraine for up to 1 dose. 10 Tab 3   • Ferrous Sulfate (IRON) 90 (18 Fe) MG Tab Take  " by mouth.       No current facility-administered medications for this visit.          Physical Exam:   Gen:           Alert and oriented, No apparent distress. Mood and affect appropriate, normal interaction with examiner.  Eyes:          PERRL, EOM intact, sclere white, conjunctive moist.  Ears:          Not examined.   Hearing:     Grossly intact.  Nose:          Normal, no lesions or deformities.  Dentition:    Good dentition.  Oropharynx:   Tongue normal, posterior pharynx without erythema or exudate.  Mallampati Classification: not examined.  Neck:        Supple, trachea midline, no masses.  Respiratory Effort: No intercostal retractions or use of accessory muscles.   Lung Auscultation:      Clear to auscultation bilaterally; no rales, rhonchi or wheezing.  CV:            Regular rate and rhythm. No murmurs, rubs or gallops.  Abd:           Not examined.   Lymphadenopathy: Not examined.  Gait and Station: Normal.  Digits and Nails: No clubbing, cyanosis, petechiae, or nodes.   Cranial Nerves: II-XII grossly intact.  Skin:        No rashes, lesions or ulcers noted.               Ext:           No cyanosis or edema.      Assessment:  1. ZAHRA (obstructive sleep apnea)     2. Current smoker     3. Morbid obesity with BMI of 45.0-49.9, adult ([facility])  Height And Weight       Immunizations:    Flu:recommend  Pneumovax 23:4/2018  Prevnar 13:not due    Plan:  1.  DME auto CPAP 5 to 15 cm nightly.  Patient understands they may have mask fits within first 30 days of therapy covered by insurance to obtain best fit.  Patient understands the need to use device every night for >4hrs to meet compliance standards for insurance purposes.  2.  Discussed sleep hygiene.  3.  Encouraged weight loss or dietary changes and gentle exercise.  4.  Follow-up with primary care for other health concerns.  5.  Follow-up in 2 to 3 months for first compliance check, sooner if needed.    Please note that this dictation was created using voice  recognition software. I have made every reasonable attempt to correct obvious errors, but it is possible there are errors of grammar and possibly content that I did not discover before finalizing the note.

## 2020-03-24 ENCOUNTER — OFFICE VISIT (OUTPATIENT)
Dept: URGENT CARE | Facility: PHYSICIAN GROUP | Age: 40
End: 2020-03-24
Payer: COMMERCIAL

## 2020-03-24 VITALS
BODY MASS INDEX: 45.98 KG/M2 | HEART RATE: 76 BPM | WEIGHT: 276 LBS | TEMPERATURE: 97.6 F | DIASTOLIC BLOOD PRESSURE: 82 MMHG | OXYGEN SATURATION: 98 % | RESPIRATION RATE: 16 BRPM | HEIGHT: 65 IN | SYSTOLIC BLOOD PRESSURE: 132 MMHG

## 2020-03-24 DIAGNOSIS — R11.0 NAUSEA: ICD-10-CM

## 2020-03-24 DIAGNOSIS — G43.009 MIGRAINE WITHOUT AURA AND WITHOUT STATUS MIGRAINOSUS, NOT INTRACTABLE: ICD-10-CM

## 2020-03-24 PROCEDURE — 99214 OFFICE O/P EST MOD 30 MIN: CPT | Performed by: FAMILY MEDICINE

## 2020-03-24 RX ORDER — ONDANSETRON 4 MG/1
4 TABLET, ORALLY DISINTEGRATING ORAL ONCE
Status: COMPLETED | OUTPATIENT
Start: 2020-03-24 | End: 2020-03-24

## 2020-03-24 RX ORDER — ACETAMINOPHEN 500 MG
500-1000 TABLET ORAL EVERY 6 HOURS PRN
COMMUNITY

## 2020-03-24 RX ORDER — ONDANSETRON 4 MG/1
4 TABLET, ORALLY DISINTEGRATING ORAL EVERY 8 HOURS PRN
Qty: 10 TAB | Refills: 0 | Status: SHIPPED | OUTPATIENT
Start: 2020-03-24

## 2020-03-24 RX ORDER — KETOROLAC TROMETHAMINE 30 MG/ML
30 INJECTION, SOLUTION INTRAMUSCULAR; INTRAVENOUS ONCE
Status: COMPLETED | OUTPATIENT
Start: 2020-03-24 | End: 2020-03-24

## 2020-03-24 RX ADMIN — KETOROLAC TROMETHAMINE 30 MG: 30 INJECTION, SOLUTION INTRAMUSCULAR; INTRAVENOUS at 15:59

## 2020-03-24 RX ADMIN — ONDANSETRON 4 MG: 4 TABLET, ORALLY DISINTEGRATING ORAL at 15:59

## 2020-03-24 ASSESSMENT — ENCOUNTER SYMPTOMS
FEVER: 0
DIARRHEA: 0
EYE REDNESS: 0
EYE DISCHARGE: 0
MYALGIAS: 0
WEIGHT LOSS: 0

## 2020-03-24 NOTE — PROGRESS NOTES
"Subjective:      Rose Qureshi is a 39 y.o. female who presents with Migraine (x2days )            Onset yesterday right sided migraine HA that is migrating to the L. +nausea. No emesis. +photophobia. +PMH migraine. +FH migraine/mom. Initially tylenol was helpful. Imitrex 50mg yesterday was helpful. Migraines have been recurrent over years on progressively worse. Requesting specialist referral for further evaluation. No other aggravating or alleviating factors.      Review of Systems   Constitutional: Negative for fever, malaise/fatigue and weight loss.   Eyes: Negative for discharge and redness.   Gastrointestinal: Negative for diarrhea.   Musculoskeletal: Negative for joint pain and myalgias.   Skin: Negative for itching and rash.     .  Medications, Allergies, and current problem list reviewed today in Epic       Objective:     /82   Pulse 76   Temp 36.4 °C (97.6 °F) (Temporal)   Resp 16   Ht 1.651 m (5' 5\")   Wt (!) 125.2 kg (276 lb)   SpO2 98%   BMI 45.93 kg/m²      Physical Exam  Constitutional:       General: She is not in acute distress.     Appearance: She is well-developed.   HENT:      Head: Normocephalic and atraumatic.      Right Ear: Tympanic membrane normal.      Left Ear: Tympanic membrane normal.      Nose: Nose normal. No rhinorrhea.      Mouth/Throat:      Mouth: Mucous membranes are moist.      Pharynx: No posterior oropharyngeal erythema.   Eyes:      Conjunctiva/sclera: Conjunctivae normal.   Neck:      Musculoskeletal: Normal range of motion and neck supple.   Cardiovascular:      Rate and Rhythm: Normal rate and regular rhythm.      Heart sounds: Normal heart sounds. No murmur.   Pulmonary:      Effort: Pulmonary effort is normal.      Breath sounds: Normal breath sounds. No wheezing.   Skin:     General: Skin is warm and dry.      Findings: No rash.   Neurological:      General: No focal deficit present.      Mental Status: She is alert and oriented to person, place, and time. "                 Assessment/Plan:     1. Migraine without aura and without status migrainosus, not intractable  ketorolac (TORADOL) injection 30 mg    REFERRAL TO NEUROLOGY   2. Nausea  ondansetron (ZOFRAN ODT) 4 MG TABLET DISPERSIBLE    ondansetron (ZOFRAN ODT) dispertab 4 mg     Differential diagnosis, natural history, supportive care, and indications for immediate follow-up discussed at length.     F/u pcp

## 2020-04-01 PROBLEM — R51.9 HEADACHE: Status: ACTIVE | Noted: 2020-01-08

## 2020-06-04 ENCOUNTER — OFFICE VISIT (OUTPATIENT)
Dept: NEUROLOGY | Facility: MEDICAL CENTER | Age: 40
End: 2020-06-04
Payer: COMMERCIAL

## 2020-06-04 VITALS
OXYGEN SATURATION: 97 % | WEIGHT: 279.98 LBS | TEMPERATURE: 99.3 F | BODY MASS INDEX: 46.65 KG/M2 | HEART RATE: 77 BPM | HEIGHT: 65 IN | SYSTOLIC BLOOD PRESSURE: 125 MMHG | DIASTOLIC BLOOD PRESSURE: 80 MMHG | RESPIRATION RATE: 18 BRPM

## 2020-06-04 DIAGNOSIS — G44.89 OTHER HEADACHE SYNDROME: ICD-10-CM

## 2020-06-04 DIAGNOSIS — Z72.0 TOBACCO ABUSE: ICD-10-CM

## 2020-06-04 PROCEDURE — 99214 OFFICE O/P EST MOD 30 MIN: CPT | Performed by: NURSE PRACTITIONER

## 2020-06-04 RX ORDER — KETOROLAC TROMETHAMINE 10 MG/1
10 TABLET, FILM COATED ORAL EVERY 6 HOURS PRN
Qty: 20 TAB | Refills: 2 | Status: SHIPPED | OUTPATIENT
Start: 2020-06-04 | End: 2021-04-20

## 2020-06-04 RX ORDER — SUMATRIPTAN 100 MG/1
TABLET, FILM COATED ORAL
Qty: 9 TAB | Refills: 5 | Status: SHIPPED | OUTPATIENT
Start: 2020-06-04 | End: 2020-06-22

## 2020-06-04 ASSESSMENT — ENCOUNTER SYMPTOMS
NERVOUS/ANXIOUS: 0
CONSTITUTIONAL NEGATIVE: 1
PHOTOPHOBIA: 1
HEADACHES: 1
DOUBLE VISION: 0
NAUSEA: 0
COUGH: 0
VOMITING: 0
DEPRESSION: 0
MUSCULOSKELETAL NEGATIVE: 1
ABDOMINAL PAIN: 0
SEIZURES: 0
SORE THROAT: 0
EYE REDNESS: 1
DIARRHEA: 0

## 2020-06-04 NOTE — PROGRESS NOTES
Subjective:      Rose Qureshi is a 39 y.o. female who presents with New Patient (Intractable migraine without aura and without status migrainosus)          HPI     History of headaches: age onset of 24, after she had her first daughter.    Headache profile: at least 2 times per week.  At the most she will have 3 headaches per day.  Typically only last one day.  Random onset.    Typical headache:  Sharp pain in the right frontal/vertex.  Pain will evolve into and around the right eye and behind the right eye causing redness of the eyes and right hemisphere.  May progress to the left hemisphere.  Photophobia, phonophobia. Occasional nausea.    Triggers: lack of sleep    Preventative tried and failed: none    Family history: mother with migraines and non-smoker lung CA with mets to brain    Medical history: anemia  Surgical history: cholecystectomy    Lives in [place], [place] with , 4 children and her father.    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    Sleep: 6-7 hours per night, to bed by 11pm.  Awakens by 7am.    Smoke: started age 16, smokes 4-5 per day.   smokes.  Once she eats she will smoke or socially.  Alcohol: rare    Sumatriptan-- 50mg tablet, takes awhile  Tylenol-- not effective    Mirena in placed    Current Outpatient Medications   Medication Sig Dispense Refill   • acetaminophen (TYLENOL) 500 MG Tab Take 500-1,000 mg by mouth every 6 hours as needed.     • ondansetron (ZOFRAN ODT) 4 MG TABLET DISPERSIBLE Take 1 Tab by mouth every 8 hours as needed. 10 Tab 0   • SUMAtriptan (IMITREX) 50 MG Tab Take 1 Tab by mouth Once PRN for Migraine for up to 1 dose. 10 Tab 3   • Ferrous Sulfate (IRON) 90 (18 Fe) MG Tab Take  by mouth.       No current facility-administered medications for this visit.        Review of Systems   Constitutional: Negative.    HENT: Negative for hearing loss, nosebleeds and sore throat.         No recent head injury.   Eyes: Positive for photophobia and redness.  "Negative for double vision.        No new loss of vision.   Respiratory: Negative for cough.         No recent lung infections.   Cardiovascular: Negative for chest pain.   Gastrointestinal: Negative for abdominal pain, diarrhea, nausea and vomiting.   Genitourinary: Negative.    Musculoskeletal: Negative.    Skin: Negative.    Neurological: Positive for headaches. Negative for seizures.   Endo/Heme/Allergies:        No history of endocrine dysfunction.  No new problems.   Psychiatric/Behavioral: Negative for depression. The patient is not nervous/anxious.         No recent mood changes.          Objective:     /80 (BP Location: Left arm, Patient Position: Sitting, BP Cuff Size: Adult)   Pulse 77   Temp 37.4 °C (99.3 °F) (Temporal)   Resp 18   Ht 1.651 m (5' 5\")   Wt (!) 127 kg (279 lb 15.8 oz)   SpO2 97%   BMI 46.59 kg/m²      Physical Exam  Constitutional:       General: She is not in acute distress.     Appearance: Normal appearance.   HENT:      Head: Normocephalic and atraumatic.      Nose: Nose normal.   Eyes:      Pupils: Pupils are equal, round, and reactive to light.      Comments: Glasses for driving   Cardiovascular:      Rate and Rhythm: Normal rate and regular rhythm.      Heart sounds: No murmur. No friction rub. No gallop.    Pulmonary:      Effort: Pulmonary effort is normal. No respiratory distress.      Breath sounds: Normal breath sounds.   Musculoskeletal: Normal range of motion.   Lymphadenopathy:      Cervical: No cervical adenopathy.   Skin:     General: Skin is warm and dry.      Comments: No birthmarks   Neurological:      Mental Status: She is alert and oriented to person, place, and time.      Gait: Gait normal.      Comments: Naturally right-handed, pleasant  CN II: Fundi normal, visual fields full to confrontation.  CN III, IV, VI: Pupils equal, round, and reactive to light.  Extraocular movements full and intact in horizontal and vertical gaze.  CN V: Normal in motor and " sensory modalities.  CN VII: No evidence of facial asymmetry.  CN VIII: Hearing grossly intact.  CN IX, X: Palate elevates symmetrically and in the midline.  CN XI: Normal sternocleidomastoid strength.  CN XII: Tongue is in the midline.    Motor: Normal muscle bulk and tone, with full and symmetric strength.  Sensory: Intact to light touch, pinprick, vibration, proprioception, and graphesthesia.  DTR's: 2+ throughout with flexor plantar responses.  Cerebellar/Coordination: Normal finger to finger, finger-tapping, rapid alternating movements, and foot tapping.  Gait: Normal casual gait.  Walks well on heels and toes, as well as in tandem gait.   Psychiatric:         Mood and Affect: Mood normal.             Assessment/Plan:     Migraine headaches:  15+ year history of headaches.  Occuring 2-3 times per month.  Unrelieved by OTC medications.    Neurological examination is normal and non-focal.    Discussed migraines and migraine management.  Discussed importance of eating routinely, staying well hydrated, a consistent sleep routine and exercise.    Discussed treatment options which include using a triptan for abortive treatment or starting a daily prophylactic medication.  Sumatriptan 50mg tablet has been minimally effective.  New Rx for sumatriptan 100mg tablet provided.    Discussed usage, side effects and benefits of triptans.      New Rx for toradol 10mg tablet provided.    Referral placed for Behavioral Health for smoking cessation.    Consider new start of the following:  Wellbutrin  Topamax  Contrave-- Naltrexone/Wellbutrin    Return for follow-up in 4-5 months or sooner if needed.

## 2020-06-07 ENCOUNTER — OFFICE VISIT (OUTPATIENT)
Dept: URGENT CARE | Facility: PHYSICIAN GROUP | Age: 40
End: 2020-06-07
Payer: COMMERCIAL

## 2020-06-07 VITALS
RESPIRATION RATE: 16 BRPM | TEMPERATURE: 96.6 F | BODY MASS INDEX: 46.65 KG/M2 | DIASTOLIC BLOOD PRESSURE: 90 MMHG | WEIGHT: 280 LBS | HEART RATE: 94 BPM | SYSTOLIC BLOOD PRESSURE: 124 MMHG | OXYGEN SATURATION: 100 % | HEIGHT: 65 IN

## 2020-06-07 DIAGNOSIS — M54.12 CERVICAL RADICULOPATHY: ICD-10-CM

## 2020-06-07 PROCEDURE — 99214 OFFICE O/P EST MOD 30 MIN: CPT | Performed by: FAMILY MEDICINE

## 2020-06-07 NOTE — PROGRESS NOTES
Subjective:      Chief Complaint   Patient presents with   • Arm Pain     bilateral arm [id number], after taking imitrex                    This is a new problem. Episode onset: 4 d ago.   Sx started after she took her imitrex for migraine headaches.   Headache is now gone.    She has never had side effect to imitrex in the past.         She c/o sharp, constant pain that starts in neck area and travels down both arms to both hands.  Pain is constant, and worse with use of both arms.   Denies headache or dizziness.   No chest pain or sob.       . Associated symptoms include: none. Pertinent negatives include no fever, headaches, numbness, pain with swallowing or tingling. Pt has tried nothing for the symptoms.         Social History     Tobacco Use   • Smoking status: Current Some Day Smoker     Packs/day: 0.25     Years: 15.00     Pack years: 3.75     Types: Cigarettes     Last attempt to quit: 10/8/2014     Years since quittin.6   • Smokeless tobacco: Former User   • Tobacco comment: cut down to 1 cigarette per day   Substance Use Topics   • Alcohol use: Yes     Comment: occas   • Drug use: No       Current Outpatient Medications on File Prior to Visit   Medication Sig Dispense Refill   • sumatriptan (IMITREX) 100 MG tablet Take 1/2-1 tablet po at onset of headache, may repeat dose in 2 hours if unrelieved.  Do not exceed more than 2 tablets in 24 hours. 9 Tab 5   • ketorolac (TORADOL) 10 MG Tab Take 1 Tab by mouth every 6 hours as needed for Moderate Pain. 20 Tab 2   • acetaminophen (TYLENOL) 500 MG Tab Take 500-1,000 mg by mouth every 6 hours as needed.     • ondansetron (ZOFRAN ODT) 4 MG TABLET DISPERSIBLE Take 1 Tab by mouth every 8 hours as needed. 10 Tab 0   • SUMAtriptan (IMITREX) 50 MG Tab Take 1 Tab by mouth Once PRN for Migraine for up to 1 dose. 10 Tab 3   • Ferrous Sulfate (IRON) 90 (18 Fe) MG Tab Take  by mouth.       No current facility-administered medications on file prior to visit.   "        Past Medical History:   Diagnosis Date   • Blood transfusion 2/2011 2/2 anemia   • Anemia 2/2011   • Headache(784.0)    • Migraine          Review of Systems   Constitutional: Negative for fever.   Musculoskeletal: Positive for neck pain.   Neurological:  . Negative for tingling, weakness, numbnes.          Objective:     /90   Pulse 94   Temp 35.9 °C (96.6 °F) (Temporal)   Resp 16   Ht 1.651 m (5' 5\")   Wt (!) 127 kg (280 lb)   SpO2 100%       Physical Exam   Constitutional: pt is oriented to person, place, and time. Pt appears well-developed and well-nourished. No distress.   HENT:   Head: Normocephalic and atraumatic.   Eyes: Conjunctivae are normal.   Cardiovascular: Normal rate and regular rhythm.    Pulmonary/Chest: Effort normal and breath sounds normal. No respiratory distress. Pt has no wheezes.   Musculoskeletal:        Cervical spine: pt exhibits no TTP over bony spine.   No muscular tenderness or spasm.     Pt exhibits no swelling.   Neurological: pt is alert and oriented to person, place, and time.   Strength:    Deltoid - 4/5 on right  4/5 on left     - 4/5 on right, 4/5 on left   Skin: Skin is warm. Pt is not diaphoretic. No erythema.   Psychiatric:  Pt's behavior is normal.   Nursing note and vitals reviewed.         EKG interpretation - normal sinus rhythm. No ST or QRS morphology changes. QT interval is normal. Axis is positive. No signs of ischemia.       Assessment/Plan:       1. Cervical radiculopathy  EKG unremarkable.   rx motrin 800mg tid prn    - EKG - Clinic Performed  - CBC WITH DIFFERENTIAL; Future  - Comp Metabolic Panel; Future  - TSH; Future  - MR-CERVICAL SPINE-W/O; Future         "

## 2020-06-08 ENCOUNTER — TELEPHONE (OUTPATIENT)
Dept: NEUROLOGY | Facility: MEDICAL CENTER | Age: 40
End: 2020-06-08

## 2020-06-08 RX ORDER — ELETRIPTAN HYDROBROMIDE 40 MG/1
TABLET, FILM COATED ORAL
Qty: 9 TAB | Refills: 2 | Status: SHIPPED | OUTPATIENT
Start: 2020-06-08 | End: 2021-04-20

## 2020-06-08 NOTE — TELEPHONE ENCOUNTER
"[place]ot from patient:     \"Hello,     So I took my last sumatriptan 50mg. Within 5 minutes both my arms and going to my shoulder feels heavy and pain. My chest is a bit tight. This is the first time this has happened. I'm scared and never felt this way. Please help.     Thank you,   Rose Qureshi\"      Please advise.   "

## 2020-06-08 NOTE — TELEPHONE ENCOUNTER
I'm sorry she had this experience with imitrex-- it is quite common but scary like she said.      Let's place imitrex as an allergy in her chart.    I will prescribe a newer option called Relpax that is usually  with the side effect profile.  Please take 1/2 tablet first to see how you do.  If experiences muscle tightness or fatigue, take ibuprofen 800mg or toradol as prescribed.

## 2020-06-09 LAB
ALBUMIN SERPL-MCNC: 4.3 G/DL (ref 3.8–4.8)
ALBUMIN/GLOB SERPL: 1.2 {RATIO} (ref 1.2–2.2)
ALP SERPL-CCNC: 104 IU/L (ref 39–117)
ALT SERPL-CCNC: 18 IU/L (ref 0–32)
AST SERPL-CCNC: 25 IU/L (ref 0–40)
BASOPHILS # BLD AUTO: 0 X10E3/UL (ref 0–0.2)
BASOPHILS NFR BLD AUTO: 0 %
BILIRUB SERPL-MCNC: 0.3 MG/DL (ref 0–1.2)
BUN SERPL-MCNC: 13 MG/DL (ref 6–24)
BUN/CREAT SERPL: 16 (ref 9–23)
CALCIUM SERPL-MCNC: 9 MG/DL (ref 8.7–10.2)
CHLORIDE SERPL-SCNC: 103 MMOL/L (ref 96–106)
CO2 SERPL-SCNC: 19 MMOL/L (ref 20–29)
CREAT SERPL-MCNC: 0.82 MG/DL (ref 0.57–1)
EOSINOPHIL # BLD AUTO: 0.1 X10E3/UL (ref 0–0.4)
EOSINOPHIL NFR BLD AUTO: 2 %
ERYTHROCYTE [DISTWIDTH] IN BLOOD BY AUTOMATED COUNT: 14.8 % (ref 11.7–15.4)
GLOBULIN SER CALC-MCNC: 3.5 G/DL (ref 1.5–4.5)
GLUCOSE SERPL-MCNC: 121 MG/DL (ref 65–99)
HCT VFR BLD AUTO: 46.1 % (ref 34–46.6)
HGB BLD-MCNC: 15.5 G/DL (ref 11.1–15.9)
IMM GRANULOCYTES # BLD AUTO: 0 X10E3/UL (ref 0–0.1)
IMM GRANULOCYTES NFR BLD AUTO: 0 %
IMMATURE CELLS  115398: ABNORMAL
LYMPHOCYTES # BLD AUTO: 1.8 X10E3/UL (ref 0.7–3.1)
LYMPHOCYTES NFR BLD AUTO: 37 %
MCH RBC QN AUTO: 28.7 PG (ref 26.6–33)
MCHC RBC AUTO-ENTMCNC: 33.6 G/DL (ref 31.5–35.7)
MCV RBC AUTO: 85 FL (ref 79–97)
MONOCYTES # BLD AUTO: 0.4 X10E3/UL (ref 0.1–0.9)
MONOCYTES NFR BLD AUTO: 9 %
MORPHOLOGY BLD-IMP: ABNORMAL
NEUTROPHILS # BLD AUTO: 2.4 X10E3/UL (ref 1.4–7)
NEUTROPHILS NFR BLD AUTO: 52 %
NRBC BLD AUTO-RTO: ABNORMAL %
PLATELET # BLD AUTO: 278 X10E3/UL (ref 150–450)
POTASSIUM SERPL-SCNC: 3.7 MMOL/L (ref 3.5–5.2)
PROT SERPL-MCNC: 7.8 G/DL (ref 6–8.5)
RBC # BLD AUTO: 5.4 X10E6/UL (ref 3.77–5.28)
SODIUM SERPL-SCNC: 138 MMOL/L (ref 134–144)
TSH SERPL DL<=0.005 MIU/L-ACNC: 1.4 UIU/ML (ref 0.45–4.5)
WBC # BLD AUTO: 4.7 X10E3/UL (ref 3.4–10.8)

## 2020-06-22 ENCOUNTER — TELEMEDICINE (OUTPATIENT)
Dept: SLEEP MEDICINE | Facility: MEDICAL CENTER | Age: 40
End: 2020-06-22
Payer: COMMERCIAL

## 2020-06-22 VITALS — RESPIRATION RATE: 16 BRPM | HEIGHT: 65 IN | BODY MASS INDEX: 44.98 KG/M2 | WEIGHT: 270 LBS

## 2020-06-22 DIAGNOSIS — F17.200 CURRENT SMOKER: ICD-10-CM

## 2020-06-22 DIAGNOSIS — G47.33 OSA (OBSTRUCTIVE SLEEP APNEA): Chronic | ICD-10-CM

## 2020-06-22 PROCEDURE — 99212 OFFICE O/P EST SF 10 MIN: CPT | Mod: 95,CR | Performed by: NURSE PRACTITIONER

## 2020-06-22 ASSESSMENT — FIBROSIS 4 INDEX: FIB4 SCORE: 0.87

## 2020-06-22 NOTE — PROGRESS NOTES
"Telemedicine Visit: Established Patient     This encounter was conducted via Zoom .   Verbal consent was obtained. Patient's identity was verified.    Subjective:   CC: ZAHRA  Ralf Qureshi is a 39 y.o. female presenting for evaluation and management of:    Last OV 2/25/2020.    Comorbidities consist of current smoker, headaches and obesity.  BMI 44.     Sleep symptoms consist of snoring, very loud snoring, witnessed apneas, resuscitative snorts, nonrestorative sleep, insomnia, waking multiple times per night, nocturia, fatigue, tiredness, falling asleep while watching TV or working on laptop, napping returning to bed after rising, restless legs relieved by massage or getting up, and nocturnal headaches.     Home sleep study 2/4/2020 noting overall IBETH 25/h with an O2 ambar of 75%.  Patient was less than 90% for 18 minutes of study.  Mean heart rate was 70 bpm.  This notes moderate obstructive sleep apnea and recommendation is in lab titration, versus auto CPAP, UPPP, dental appliance or weight loss. She opted for CPAP.  Currently using auto CPAP 5 to 15 cm nightly.  Compliance report 5/21/2020 through 6/19/2020 indicates 93.3% compliance, average nightly use 6 hours, mean pressure 9.4 cm, no significant mask leak with reduced AHI 1.0/h. She tolerates mask and pressure well. She \"loves it\". She noted some sinus congestion the last 2 nights with bad allergies. She feels rested. Energy levels better.   She had MRI for neck for numbness from shoulder area into hand/feet but had not received results. We did have report from [facility] and reviewed with patient. Advised her to f/u with PCP to review further and setup treatment plan. Overall mild degenerative changes with small disc bulge at C3-4.  She denies cardiac or respiratory symptoms.    ROS in HPI; otherwise negative.      Allergies   Allergen Reactions   • Imitrex [Sumatriptan] Anaphylaxis     Chest pain, numbness in limbs        Current medicines (including changes " today)  Current Outpatient Medications   Medication Sig Dispense Refill   • eletriptan (RELPAX) 40 MG tablet Take 1/2-1 tablet po at onset of headache, may repeat dose in 2 hours if unrelieved.  Do not exceed more than 2 tablets in 24 hours. 9 Tab 2   • ketorolac (TORADOL) 10 MG Tab Take 1 Tab by mouth every 6 hours as needed for Moderate Pain. 20 Tab 2   • acetaminophen (TYLENOL) 500 MG Tab Take 500-1,000 mg by mouth every 6 hours as needed.     • ondansetron (ZOFRAN ODT) 4 MG TABLET DISPERSIBLE Take 1 Tab by mouth every 8 hours as needed. 10 Tab 0   • Ferrous Sulfate (IRON) 90 (18 Fe) MG Tab Take  by mouth.       No current facility-administered medications for this visit.        Patient Active Problem List    Diagnosis Date Noted   • ZAHRA (obstructive sleep apnea) 01/08/2020   • Headache(784.0) 01/08/2020   • Tobacco abuse 01/21/2019   • Morbid obesity with BMI of 45.0-49.9, adult ([facility]) 04/17/2018   • Family history of diabetes mellitus 02/09/2011   • Anemia 02/01/2011       Family History   Problem Relation Age of Onset   • Diabetes Maternal Grandmother         insulin   • Hypertension Maternal Grandmother    • Cancer Maternal Grandfather         lung cancer   • Cancer Mother         lung   • Other Father         unknown       She  has a past medical history of Anemia (2/2011), Blood transfusion (2/2011), Headache(784.0), and Migraine. She also has no past medical history of Allergy, Anxiety, Arrhythmia, Arthritis, ASTHMA, Cancer ([facility]), CATARACT, CHF (congestive heart failure) ([facility]), Clotting disorder ([facility]), COPD, Depression, Diabetes, EMPHYSEMA, GERD (gastroesophageal reflux disease), Glaucoma, Goiter, Heart attack ([facility]), Heart murmur, HIV (human immunodeficiency virus infection), Hyperlipidemia, Hypertension, IBD (inflammatory bowel disease), Kidney disease, Muscle disorder, OSTEOPOROSIS, Parathyroid disorder ([facility]), Pituitary disease ([facility]), Seizure ([facility]), Stroke ([facility]), Substance abuse ([facility]), Thyroid disease,  "Tuberculosis, Ulcer, or Urinary tract infection, site not specified.  She  has a past surgical history that includes tre by laparoscopy (2/15/2011) and dilation and curettage (2006).       Objective:   Resp 16   Ht 1.651 m (5' 5\")   Wt 122.5 kg (270 lb)   BMI 44.93 kg/m²     Physical Exam:  Constitutional: Alert, no distress, well-groomed.  Skin: No rashes in visible areas.  Eye: Round. Conjunctiva clear, lids normal. No icterus.   ENMT: Lips pink without lesions, good dentition, moist mucous membranes. Phonation normal.  Neck: No masses, no thyromegaly. Moves freely without pain.  CV: Pulse as reported by patient  Respiratory: Unlabored respiratory effort, no cough or audible wheeze  Psych: Alert and oriented x3, normal affect and mood.       Assessment and Plan:   The following treatment plan was discussed:     1. ZAHRA (obstructive sleep apnea)    2. BMI 40.0-44.9, adult (HCC)  - Height And Weight    3. Current smoker    Continue APAP 5-15cm H20 night.  DME CNOX on pap now. Advised patient to call for results/send message through [employer].  Discussed sleep hygiene  Encouraged further weight loss through diet and exercise  F/u with PCP to discuss MRI results      Follow-up: 4 mos with compliance report/CNOX results, sooner if needed. If symptoms stable, may go to annual visits.           "

## 2020-12-08 ENCOUNTER — OFFICE VISIT (OUTPATIENT)
Dept: URGENT CARE | Facility: PHYSICIAN GROUP | Age: 40
End: 2020-12-08
Payer: COMMERCIAL

## 2020-12-08 VITALS
RESPIRATION RATE: 18 BRPM | DIASTOLIC BLOOD PRESSURE: 78 MMHG | BODY MASS INDEX: 46.98 KG/M2 | OXYGEN SATURATION: 99 % | HEIGHT: 65 IN | TEMPERATURE: 96.6 F | SYSTOLIC BLOOD PRESSURE: 110 MMHG | WEIGHT: 282 LBS | HEART RATE: 98 BPM

## 2020-12-08 DIAGNOSIS — S39.012A STRAIN OF LUMBAR PARASPINOUS MUSCLE, INITIAL ENCOUNTER: ICD-10-CM

## 2020-12-08 PROCEDURE — 99214 OFFICE O/P EST MOD 30 MIN: CPT | Performed by: PHYSICIAN ASSISTANT

## 2020-12-08 RX ORDER — CYCLOBENZAPRINE HCL 10 MG
10 TABLET ORAL
Qty: 15 TAB | Refills: 0 | Status: SHIPPED | OUTPATIENT
Start: 2020-12-08 | End: 2021-04-20

## 2020-12-08 RX ORDER — KETOROLAC TROMETHAMINE 30 MG/ML
30 INJECTION, SOLUTION INTRAMUSCULAR; INTRAVENOUS ONCE
Status: COMPLETED | OUTPATIENT
Start: 2020-12-08 | End: 2020-12-08

## 2020-12-08 RX ADMIN — KETOROLAC TROMETHAMINE 30 MG: 30 INJECTION, SOLUTION INTRAMUSCULAR; INTRAVENOUS at 12:36

## 2020-12-08 ASSESSMENT — ENCOUNTER SYMPTOMS
ABDOMINAL PAIN: 0
DIARRHEA: 0
CHILLS: 0
EYE PAIN: 0
NAUSEA: 0
MYALGIAS: 0
SORE THROAT: 0
SHORTNESS OF BREATH: 0
FEVER: 0
CONSTIPATION: 0
BACK PAIN: 1
HEADACHES: 0
COUGH: 0
VOMITING: 0

## 2020-12-08 ASSESSMENT — FIBROSIS 4 INDEX: FIB4 SCORE: 0.89

## 2020-12-08 NOTE — PROGRESS NOTES
Subjective:   Ralf Qureshi is a 40 y.o. female who presents for Low Back Pain (mid/ R sided back [id number] )      HPI:  This is a pleasant 40 year old female presenting to  c/o 8 days of middle and right sided lumbar back pain after pulling muscle while exiting bed.  She has been taking antiinflammatories with minimal relief. She describes a dull pain with occasional sharp pull. Pain and tightness is worse in the am.  She notes pain with ROm. She denies urinary symptoms, bowel dysfunction, paresthesias and weakness in extremities.  She has no history of back problems, back surgery, IVDA.     Review of Systems   Constitutional: Negative for chills and fever.   HENT: Negative for congestion, ear pain and sore throat.    Eyes: Negative for pain.   Respiratory: Negative for cough and shortness of breath.    Cardiovascular: Negative for chest pain.   Gastrointestinal: Negative for abdominal pain, constipation, diarrhea, nausea and vomiting.   Genitourinary: Negative for dysuria.   Musculoskeletal: Positive for back pain. Negative for myalgias.   Skin: Negative for rash.   Neurological: Negative for headaches.       Medications:    • acetaminophen Tabs  • cyclobenzaprine Tabs  • diclofenac sodium Gel  • eletriptan  • Iron Tabs  • ketorolac Tabs  • ondansetron Tbdp    Allergies: Imitrex [sumatriptan]    Problem List: Ralf Qureshi has Family history of diabetes mellitus; Anemia; Morbid obesity with BMI of 45.0-49.9, adult (HCC); Tobacco abuse; ZAHRA (obstructive sleep apnea); and Headache(784.0) on their problem list.    Surgical History:  Past Surgical History:   Procedure Laterality Date   • BEAR BY LAPAROSCOPY  2/15/2011    Performed by LALITA ALANIS at SURGERY [facility] ORS   • DILATION AND CURETTAGE  2006       Past Social Hx: Ralf Qureshi  reports that she has been smoking cigarettes. She has a 3.75 pack-year smoking history. She has quit using smokeless tobacco. She reports current alcohol use.  "She reports that she does not use drugs.     Past Family Hx:  Ralf Qureshi family history includes Cancer in her maternal grandfather and mother; Diabetes in her maternal grandmother; Hypertension in her maternal grandmother; Other in her father.     Problem list, medications, and allergies reviewed by myself today in Epic.     Objective:     /78   Pulse 98   Temp 35.9 °C (96.6 °F) (Temporal)   Resp 18   Ht 1.651 m (5' 5\")   Wt (!) 127.9 kg (282 lb)   SpO2 99%   BMI 46.93 kg/m²     Physical Exam  Vitals signs reviewed.   Constitutional:       Appearance: Normal appearance.   HENT:      Head: Normocephalic and atraumatic.      Right Ear: External ear normal.      Left Ear: External ear normal.      Nose: Nose normal.      Mouth/Throat:      Mouth: Mucous membranes are moist.   Eyes:      Conjunctiva/sclera: Conjunctivae normal.   Cardiovascular:      Rate and Rhythm: Normal rate.   Pulmonary:      Effort: Pulmonary effort is normal.   Musculoskeletal:      Lumbar back: She exhibits decreased range of motion, tenderness and spasm. She exhibits no bony tenderness, no swelling, no edema, no deformity and no laceration.   Skin:     General: Skin is warm and dry.      Capillary Refill: Capillary refill takes less than 2 seconds.   Neurological:      Mental Status: She is alert and oriented to person, place, and time.         Assessment/Plan:     Diagnosis and associated orders:     1. Strain of lumbar paraspinous muscle, initial encounter  cyclobenzaprine (FLEXERIL) 10 mg Tab    diclofenac sodium 1 % Gel    REFERRAL TO [facility]    ketorolac (TORADOL) injection 30 mg      Comments/MDM:     No indication for imaging, will go forward with conservative management as she has no red flags for severe underlying pathology such as HARJINDER, CMS, SEA. I counseled the patient on the sedating side effects of Flexeril.  I cautioned them that, although not a controlled medication, this may cause sedation and they " should not operate machinery including driving or performing any effort intensive tasks while using the medication.  They demonstrated verbal understanding of these risks  • toradol in clinic  • Advised starting tomorrow with 400mg motrin TID and additional dose of tylenol for continued pain  • Diclofenac cream topically  • Er precautions for fever, worsening pain, numbness, or s/s of HARJINDER         Differential diagnosis, natural history, supportive care, and indications for immediate follow-up discussed.    Advised the patient to follow-up with the primary care physician for recheck, reevaluation, and consideration of further management.    Please note that this dictation was created using voice recognition software. I have made a reasonable attempt to correct obvious errors, but I expect that there are errors of grammar and possibly content that I did not discover before finalizing the note.    This note was electronically signed by Harman Smith PA-C

## 2021-04-20 ENCOUNTER — TELEMEDICINE (OUTPATIENT)
Dept: MEDICAL GROUP | Facility: PHYSICIAN GROUP | Age: 41
End: 2021-04-20
Payer: COMMERCIAL

## 2021-04-20 VITALS — RESPIRATION RATE: 14 BRPM | BODY MASS INDEX: 46.98 KG/M2 | WEIGHT: 282 LBS | HEIGHT: 65 IN

## 2021-04-20 DIAGNOSIS — G47.33 OSA (OBSTRUCTIVE SLEEP APNEA): Chronic | ICD-10-CM

## 2021-04-20 DIAGNOSIS — Z72.0 TOBACCO ABUSE: ICD-10-CM

## 2021-04-20 DIAGNOSIS — Z12.31 ENCOUNTER FOR SCREENING MAMMOGRAM FOR BREAST CANCER: ICD-10-CM

## 2021-04-20 DIAGNOSIS — Z13.29 SCREENING FOR ENDOCRINE, METABOLIC AND IMMUNITY DISORDER: ICD-10-CM

## 2021-04-20 DIAGNOSIS — D50.8 IRON DEFICIENCY ANEMIA SECONDARY TO INADEQUATE DIETARY IRON INTAKE: ICD-10-CM

## 2021-04-20 DIAGNOSIS — Z13.0 SCREENING FOR ENDOCRINE, METABOLIC AND IMMUNITY DISORDER: ICD-10-CM

## 2021-04-20 DIAGNOSIS — E66.01 MORBID OBESITY WITH BMI OF 45.0-49.9, ADULT (HCC): ICD-10-CM

## 2021-04-20 DIAGNOSIS — Z13.228 SCREENING FOR ENDOCRINE, METABOLIC AND IMMUNITY DISORDER: ICD-10-CM

## 2021-04-20 DIAGNOSIS — R82.90 ABNORMAL URINE ODOR: ICD-10-CM

## 2021-04-20 PROBLEM — R51.9 HEADACHE: Status: RESOLVED | Noted: 2020-01-08 | Resolved: 2021-04-20

## 2021-04-20 PROCEDURE — 99214 OFFICE O/P EST MOD 30 MIN: CPT | Mod: 25,95,CR | Performed by: PHYSICIAN ASSISTANT

## 2021-04-20 PROCEDURE — 99406 BEHAV CHNG SMOKING 3-10 MIN: CPT | Mod: 25,95,CR | Performed by: PHYSICIAN ASSISTANT

## 2021-04-20 RX ORDER — VARENICLINE TARTRATE 1 MG/1
1 TABLET, FILM COATED ORAL 2 TIMES DAILY
Qty: 60 TABLET | Refills: 3 | Status: SHIPPED | OUTPATIENT
Start: 2021-04-20

## 2021-04-20 ASSESSMENT — PATIENT HEALTH QUESTIONNAIRE - PHQ9: CLINICAL INTERPRETATION OF PHQ2 SCORE: 0

## 2021-04-20 ASSESSMENT — FIBROSIS 4 INDEX: FIB4 SCORE: 0.89

## 2021-04-20 NOTE — ASSESSMENT & PLAN NOTE
Patient states that she knows she needs to lose weight but has a fear of losing weight because her family members have lost weight and then ended up being diagnosed with cancer coincidentally. Has been talking to her  a lot about it

## 2021-04-20 NOTE — ASSESSMENT & PLAN NOTE
Has tried quitting in the past cold turkey. Interested in quitting. Patient does not have a history of mental health concerns or suicidality.

## 2021-04-20 NOTE — PROGRESS NOTES
Virtual Visit: Established Patient   This visit was conducted via Zoom using secure and encrypted videoconferencing technology. The patient was in a private location in the state of [place].    The patient's identity was confirmed and verbal consent was obtained for this virtual visit.    Subjective:   CC: Establish care, tobacco abuse    Ralf Qureshi is a 40 y.o. female presenting for evaluation and management of:    Tobacco abuse  Has tried quitting in the past cold turkey. Interested in quitting. Patient does not have a history of mental health concerns or suicidality.     ZAHRA (obstructive sleep apnea)  Using CPAP nightly and sleeping better    Anemia  No longer taking iron     Morbid obesity with BMI of 45.0-49.9, adult (HCC)  Patient states that she knows she needs to lose weight but has a fear of losing weight because her family members have lost weight and then ended up being diagnosed with cancer coincidentally. Has been talking to her  a lot about it     ROS   Denies any recent fevers or chills. No nausea or vomiting. No chest pains or shortness of breath.     Allergies   Allergen Reactions   • Imitrex [Sumatriptan] Anaphylaxis     Chest pain, numbness in limbs        Current medicines (including changes today)  Current Outpatient Medications   Medication Sig Dispense Refill   • varenicline (CHANTIX STARTING MONTH SCAR) 0.5 MG X 11 & 1 MG X 42 tablet Take by mouth daily according to package insert 56 Each 0   • varenicline (CHANTIX CONTINUING MONTH SCAR) 1 MG tablet Take 1 tablet by mouth 2 times a day. 60 tablet 3   • acetaminophen (TYLENOL) 500 MG Tab Take 500-1,000 mg by mouth every 6 hours as needed.     • ondansetron (ZOFRAN ODT) 4 MG TABLET DISPERSIBLE Take 1 Tab by mouth every 8 hours as needed. 10 Tab 0     No current facility-administered medications for this visit.       Patient Active Problem List    Diagnosis Date Noted   • ZAHRA (obstructive sleep apnea) 01/08/2020   • Tobacco abuse  "01/21/2019   • Morbid obesity with BMI of 45.0-49.9, adult ([facility]) 04/17/2018   • Family history of diabetes mellitus 02/09/2011   • Anemia 02/01/2011       Family History   Problem Relation Age of Onset   • Diabetes Maternal Grandmother         insulin   • Hypertension Maternal Grandmother    • Cancer Maternal Grandfather         lung cancer   • Cancer Mother         lung   • Other Father         unknown       She  has a past medical history of Anemia (2/2011), Blood transfusion (2/2011), Headache(784.0), and Migraine. She also has no past medical history of Allergy, Anxiety, Arrhythmia, Arthritis, ASTHMA, Cancer ([facility]), CATARACT, CHF (congestive heart failure) ([facility]), Clotting disorder ([facility]), COPD, Depression, Diabetes, EMPHYSEMA, GERD (gastroesophageal reflux disease), Glaucoma, Goiter, Heart attack ([facility]), Heart murmur, HIV (human immunodeficiency virus infection), Hyperlipidemia, Hypertension, IBD (inflammatory bowel disease), Kidney disease, Muscle disorder, OSTEOPOROSIS, Parathyroid disorder ([facility]), Pituitary disease ([facility]), Seizure ([facility]), Stroke ([facility]), Substance abuse ([facility]), Thyroid disease, Tuberculosis, Ulcer, or Urinary tract infection, site not specified.  She  has a past surgical history that includes tre by laparoscopy (2/15/2011) and dilation and curettage (2006).       Objective:   Resp 14   Ht 1.651 m (5' 5\")   Wt (!) 128 kg (282 lb)   BMI 46.93 kg/m²     Physical Exam:  Constitutional: Alert, no distress, well-groomed.  Skin: No rashes in visible areas.  Eye: Round. Conjunctiva clear, lids normal. No icterus.   ENMT: Lips pink without lesions, good dentition, moist mucous membranes. Phonation normal.  Neck: No masses, no thyromegaly. Moves freely without pain.  Respiratory: Unlabored respiratory effort, no cough or audible wheeze  Psych: Alert and oriented x3, normal affect and mood.       Assessment and Plan:   The following treatment plan was discussed:     1. Tobacco abuse  Based on patient's " desire of wanting to stop smoking, I will prescribe Chantix therapy. The neuropsychological and physiological side effects of Chantix were explained to patient at great length. At this time, patient has agreed to proceed with Chantix therapy. They were instructed on how to correctly take the medication per package instructions with day 1-3: 0.5 mg in the morning, day 4-7: 0.5 mg in morning and in evening. Stop smoking. Then start taking 1 mg po twice a day for 12-24 weeks. I instructed the patient to stop taking the medication immediately and f/u in clinic if depression or suicidality develop.  I also sent the patient a message with these instructions on how to correctly take the medication.  - varenicline (CHANTIX STARTING MONTH PAK) 0.5 MG X 11 & 1 MG X 42 tablet; Take by mouth daily according to package insert  Dispense: 56 Each; Refill: 0  - varenicline (CHANTIX CONTINUING MONTH PAK) 1 MG tablet; Take 1 tablet by mouth 2 times a day.  Dispense: 60 tablet; Refill: 3    2. Morbid obesity with BMI of 45.0-49.9, adult (HCC)  Chronic.  Patient seems to have a fear of losing weight because she noticed that when her family members lost weight they got diagnosed with cancer.  Discussed that this was likely coincidental that the weight loss itself did not actually cause her cancer.  Discussed it would be helpful for her to see behavioral health to discuss these concerns, but the patient states that her  is very supportive and she does not think she needs that at this time.  We discussed a referral to our health improvement program which she thinks would be very helpful for her.  - REFERRAL TO [facility] HEALTH IMPROVEMENT PROGRAMS (HIP)    3. ZAHRA (obstructive sleep apnea)  Chronic.  Patient reports she had her sleeping has improved since using her CPAP machine nightly    4. Iron deficiency anemia secondary to inadequate dietary iron intake  Chronic.  Patient reports that she is no longer supplementing with iron.  Plan  to order a CBC as well as iron and ferritin to reevaluate for iron deficiency anemia.  - CBC WITH DIFFERENTIAL; Future  - IRON/TOTAL IRON BIND; Future  - FERRITIN; Future    5. Encounter for screening mammogram for breast cancer  Mammogram ordered today.   - MA-SCREENING MAMMO BILAT W/TOMOSYNTHESIS W/CAD; Future    6. Abnormal urine odor  Patient reports that she has been having abnormal urine odor.  Discussed different etiologies of this and ordered a urinalysis for her to get done with her blood work.  - URINALYSIS,CULTURE IF INDICATED; Future    7. Screening for endocrine, metabolic and immunity disorder  Annual labs ordered  - Comp Metabolic Panel; Future  - VITAMIN D,25 HYDROXY; Future  - Lipid Profile; Future  - TSH WITH REFLEX TO FT4; Future  - HEMOGLOBIN A1C; Future    Patient is advised to stop using tobacco and tobacco products. We discussed abrupt cessation, nicotine gum or patches, medications such as bupropion or Chantix, and nicotine inhalers including electronic cigarettes.  Approximately 10 minutes was spent with the patient discussing related health consequences of tobacco use, nonpharmacologic and pharmacologic treatment modalities.  Educational handout also provided.    Follow-up: Return in about 3 months (around 7/20/2021) for Follow-up smoking cessation.

## 2021-05-26 ENCOUNTER — APPOINTMENT (OUTPATIENT)
Dept: RADIOLOGY | Facility: MEDICAL CENTER | Age: 41
End: 2021-05-26
Attending: PHYSICIAN ASSISTANT
Payer: COMMERCIAL

## 2023-10-23 ENCOUNTER — EH NON-PROVIDER (OUTPATIENT)
Dept: OCCUPATIONAL MEDICINE | Facility: CLINIC | Age: 43
End: 2023-10-23

## 2023-10-23 ENCOUNTER — HOSPITAL ENCOUNTER (OUTPATIENT)
Facility: MEDICAL CENTER | Age: 43
End: 2023-10-23
Attending: PREVENTIVE MEDICINE
Payer: COMMERCIAL

## 2023-10-23 ENCOUNTER — EMPLOYEE HEALTH (OUTPATIENT)
Dept: OCCUPATIONAL MEDICINE | Facility: CLINIC | Age: 43
End: 2023-10-23

## 2023-10-23 DIAGNOSIS — Z02.89 ENCOUNTER FOR OCCUPATIONAL HEALTH ASSESSMENT: ICD-10-CM

## 2023-10-23 DIAGNOSIS — Z02.1 PRE-EMPLOYMENT DRUG SCREENING: ICD-10-CM

## 2023-10-23 LAB
AMP AMPHETAMINE: NORMAL
BAR BARBITURATES: NORMAL
BZO BENZODIAZEPINES: NORMAL
COC COCAINE: NORMAL
INT CON NEG: NORMAL
INT CON POS: NORMAL
MDMA ECSTASY: NORMAL
MET METHAMPHETAMINES: NORMAL
MTD METHADONE: NORMAL
OPI OPIATES: NORMAL
OXY OXYCODONE: NORMAL
PCP PHENCYCLIDINE: NORMAL
POC URINE DRUG SCREEN OCDRS: NEGATIVE
THC: NORMAL

## 2023-10-23 PROCEDURE — 90686 IIV4 VACC NO PRSV 0.5 ML IM: CPT | Performed by: NURSE PRACTITIONER

## 2023-10-23 PROCEDURE — 90746 HEPB VACCINE 3 DOSE ADULT IM: CPT | Performed by: NURSE PRACTITIONER

## 2023-10-23 PROCEDURE — 86480 TB TEST CELL IMMUN MEASURE: CPT | Performed by: PREVENTIVE MEDICINE

## 2023-10-23 PROCEDURE — 80305 DRUG TEST PRSMV DIR OPT OBS: CPT | Performed by: PREVENTIVE MEDICINE

## 2023-10-23 PROCEDURE — 8915 PR COMPREHENSIVE PHYSICAL: Performed by: PREVENTIVE MEDICINE

## 2023-10-23 PROCEDURE — 90707 MMR VACCINE SC: CPT | Performed by: NURSE PRACTITIONER

## 2023-10-25 LAB
GAMMA INTERFERON BACKGROUND BLD IA-ACNC: 0.41 IU/ML
M TB IFN-G BLD-IMP: NEGATIVE
M TB IFN-G CD4+ BCKGRND COR BLD-ACNC: 0.01 IU/ML
MITOGEN IGNF BCKGRD COR BLD-ACNC: 8.51 IU/ML
QFT TB2 - NIL TBQ2: 0 IU/ML

## 2024-04-19 ENCOUNTER — EH NON-PROVIDER (OUTPATIENT)
Dept: OCCUPATIONAL MEDICINE | Facility: CLINIC | Age: 44
End: 2024-04-19

## 2024-04-19 DIAGNOSIS — Z23 ENCOUNTER FOR ADMINISTRATION OF VACCINE: Primary | ICD-10-CM

## 2024-04-19 PROCEDURE — 90746 HEPB VACCINE 3 DOSE ADULT IM: CPT | Performed by: NURSE PRACTITIONER

## 2024-05-28 SDOH — ECONOMIC STABILITY: HOUSING INSECURITY: IN THE LAST 12 MONTHS, HOW MANY PLACES HAVE YOU LIVED?: 1

## 2024-05-28 SDOH — ECONOMIC STABILITY: HOUSING INSECURITY
IN THE LAST 12 MONTHS, WAS THERE A TIME WHEN YOU DID NOT HAVE A STEADY PLACE TO SLEEP OR SLEPT IN A SHELTER (INCLUDING NOW)?: NO

## 2024-05-28 SDOH — HEALTH STABILITY: PHYSICAL HEALTH: ON AVERAGE, HOW MANY DAYS PER WEEK DO YOU ENGAGE IN MODERATE TO STRENUOUS EXERCISE (LIKE A BRISK WALK)?: 1 DAY

## 2024-05-28 SDOH — ECONOMIC STABILITY: TRANSPORTATION INSECURITY
IN THE PAST 12 MONTHS, HAS LACK OF TRANSPORTATION KEPT YOU FROM MEETINGS, WORK, OR FROM GETTING THINGS NEEDED FOR DAILY LIVING?: NO

## 2024-05-28 SDOH — ECONOMIC STABILITY: FOOD INSECURITY: WITHIN THE PAST 12 MONTHS, YOU WORRIED THAT YOUR FOOD WOULD RUN OUT BEFORE YOU GOT MONEY TO BUY MORE.: SOMETIMES TRUE

## 2024-05-28 SDOH — ECONOMIC STABILITY: INCOME INSECURITY: HOW HARD IS IT FOR YOU TO PAY FOR THE VERY BASICS LIKE FOOD, HOUSING, MEDICAL CARE, AND HEATING?: SOMEWHAT HARD

## 2024-05-28 SDOH — ECONOMIC STABILITY: INCOME INSECURITY: IN THE LAST 12 MONTHS, WAS THERE A TIME WHEN YOU WERE NOT ABLE TO PAY THE MORTGAGE OR RENT ON TIME?: YES

## 2024-05-28 SDOH — HEALTH STABILITY: PHYSICAL HEALTH: ON AVERAGE, HOW MANY MINUTES DO YOU ENGAGE IN EXERCISE AT THIS LEVEL?: 60 MIN

## 2024-05-28 SDOH — ECONOMIC STABILITY: FOOD INSECURITY: WITHIN THE PAST 12 MONTHS, THE FOOD YOU BOUGHT JUST DIDN'T LAST AND YOU DIDN'T HAVE MONEY TO GET MORE.: SOMETIMES TRUE

## 2024-05-28 SDOH — ECONOMIC STABILITY: HOUSING INSECURITY
IN THE LAST 12 MONTHS, WAS THERE A TIME WHEN YOU DID NOT HAVE A STEADY PLACE TO SLEEP OR SLEPT IN A SHELTER (INCLUDING NOW)?: YES

## 2024-05-28 SDOH — ECONOMIC STABILITY: TRANSPORTATION INSECURITY
IN THE PAST 12 MONTHS, HAS THE LACK OF TRANSPORTATION KEPT YOU FROM MEDICAL APPOINTMENTS OR FROM GETTING MEDICATIONS?: NO

## 2024-05-28 SDOH — ECONOMIC STABILITY: TRANSPORTATION INSECURITY
IN THE PAST 12 MONTHS, HAS LACK OF RELIABLE TRANSPORTATION KEPT YOU FROM MEDICAL APPOINTMENTS, MEETINGS, WORK OR FROM GETTING THINGS NEEDED FOR DAILY LIVING?: NO

## 2024-05-28 SDOH — HEALTH STABILITY: MENTAL HEALTH
STRESS IS WHEN SOMEONE FEELS TENSE, NERVOUS, ANXIOUS, OR CAN'T SLEEP AT NIGHT BECAUSE THEIR MIND IS TROUBLED. HOW STRESSED ARE YOU?: TO SOME EXTENT

## 2024-05-28 ASSESSMENT — SOCIAL DETERMINANTS OF HEALTH (SDOH)
HOW OFTEN DO YOU GET TOGETHER WITH FRIENDS OR RELATIVES?: ONCE A WEEK
HOW OFTEN DO YOU GET TOGETHER WITH FRIENDS OR RELATIVES?: ONCE A WEEK
HOW OFTEN DO YOU HAVE SIX OR MORE DRINKS ON ONE OCCASION: NEVER
IN A TYPICAL WEEK, HOW MANY TIMES DO YOU TALK ON THE PHONE WITH FAMILY, FRIENDS, OR NEIGHBORS?: TWICE A WEEK
HOW OFTEN DO YOU ATTENT MEETINGS OF THE CLUB OR ORGANIZATION YOU BELONG TO?: MORE THAN 4 TIMES PER YEAR
HOW HARD IS IT FOR YOU TO PAY FOR THE VERY BASICS LIKE FOOD, HOUSING, MEDICAL CARE, AND HEATING?: SOMEWHAT HARD
HOW MANY DRINKS CONTAINING ALCOHOL DO YOU HAVE ON A TYPICAL DAY WHEN YOU ARE DRINKING: 1 OR 2
HOW OFTEN DO YOU HAVE A DRINK CONTAINING ALCOHOL: MONTHLY OR LESS
HOW OFTEN DO YOU ATTEND CHURCH OR RELIGIOUS SERVICES?: MORE THAN 4 TIMES PER YEAR
HOW OFTEN DO YOU ATTEND CHURCH OR RELIGIOUS SERVICES?: MORE THAN 4 TIMES PER YEAR
HOW OFTEN DO YOU ATTENT MEETINGS OF THE CLUB OR ORGANIZATION YOU BELONG TO?: MORE THAN 4 TIMES PER YEAR
DO YOU BELONG TO ANY CLUBS OR ORGANIZATIONS SUCH AS CHURCH GROUPS UNIONS, FRATERNAL OR ATHLETIC GROUPS, OR SCHOOL GROUPS?: YES
IN A TYPICAL WEEK, HOW MANY TIMES DO YOU TALK ON THE PHONE WITH FAMILY, FRIENDS, OR NEIGHBORS?: TWICE A WEEK
WITHIN THE PAST 12 MONTHS, YOU WORRIED THAT YOUR FOOD WOULD RUN OUT BEFORE YOU GOT THE MONEY TO BUY MORE: SOMETIMES TRUE
DO YOU BELONG TO ANY CLUBS OR ORGANIZATIONS SUCH AS CHURCH GROUPS UNIONS, FRATERNAL OR ATHLETIC GROUPS, OR SCHOOL GROUPS?: YES

## 2024-05-28 ASSESSMENT — LIFESTYLE VARIABLES
AUDIT-C TOTAL SCORE: 1
HOW OFTEN DO YOU HAVE A DRINK CONTAINING ALCOHOL: MONTHLY OR LESS
HOW MANY STANDARD DRINKS CONTAINING ALCOHOL DO YOU HAVE ON A TYPICAL DAY: 1 OR 2
SKIP TO QUESTIONS 9-10: 1
HOW OFTEN DO YOU HAVE SIX OR MORE DRINKS ON ONE OCCASION: NEVER

## 2024-05-31 ENCOUNTER — OFFICE VISIT (OUTPATIENT)
Dept: MEDICAL GROUP | Facility: PHYSICIAN GROUP | Age: 44
End: 2024-05-31
Payer: COMMERCIAL

## 2024-05-31 VITALS
RESPIRATION RATE: 14 BRPM | HEART RATE: 71 BPM | WEIGHT: 293 LBS | DIASTOLIC BLOOD PRESSURE: 78 MMHG | SYSTOLIC BLOOD PRESSURE: 114 MMHG | BODY MASS INDEX: 47.09 KG/M2 | TEMPERATURE: 97.4 F | HEIGHT: 66 IN | OXYGEN SATURATION: 97 %

## 2024-05-31 DIAGNOSIS — E66.01 MORBID OBESITY WITH BMI OF 45.0-49.9, ADULT (HCC): ICD-10-CM

## 2024-05-31 DIAGNOSIS — E55.9 VITAMIN D DEFICIENCY: ICD-10-CM

## 2024-05-31 DIAGNOSIS — G47.33 OSA (OBSTRUCTIVE SLEEP APNEA): Chronic | ICD-10-CM

## 2024-05-31 DIAGNOSIS — D50.8 IRON DEFICIENCY ANEMIA SECONDARY TO INADEQUATE DIETARY IRON INTAKE: ICD-10-CM

## 2024-05-31 DIAGNOSIS — Z12.31 ENCOUNTER FOR SCREENING MAMMOGRAM FOR MALIGNANT NEOPLASM OF BREAST: ICD-10-CM

## 2024-05-31 DIAGNOSIS — Z76.89 ENCOUNTER TO ESTABLISH CARE: ICD-10-CM

## 2024-05-31 DIAGNOSIS — Z72.0 TOBACCO ABUSE: ICD-10-CM

## 2024-05-31 DIAGNOSIS — Z13.228 SCREENING FOR METABOLIC DISORDER: ICD-10-CM

## 2024-05-31 DIAGNOSIS — Z11.59 NEED FOR HEPATITIS C SCREENING TEST: ICD-10-CM

## 2024-05-31 DIAGNOSIS — L81.9 HYPERPIGMENTATION OF SKIN: ICD-10-CM

## 2024-05-31 ASSESSMENT — ENCOUNTER SYMPTOMS
PSYCHIATRIC NEGATIVE: 1
EYES NEGATIVE: 1
FEVER: 0
SHORTNESS OF BREATH: 0
MUSCULOSKELETAL NEGATIVE: 1
COUGH: 0
CONSTITUTIONAL NEGATIVE: 1
NEUROLOGICAL NEGATIVE: 1
SPUTUM PRODUCTION: 0
PALPITATIONS: 0
GASTROINTESTINAL NEGATIVE: 1

## 2024-05-31 ASSESSMENT — PATIENT HEALTH QUESTIONNAIRE - PHQ9: CLINICAL INTERPRETATION OF PHQ2 SCORE: 0

## 2024-05-31 NOTE — PROGRESS NOTES
Subjective       CC:    Chief Complaint   Patient presents with    Establish Care        HISTORY OF THE PRESENT ILLNESS: Patient is a 43 y.o. female, here today to establish care. Prior PCP was at [facility], last seen in 2023.     ZAHRA (obstructive sleep apnea)  Reports she was diagnosed with sleep apnea in 2022; using CPAP at night. Sees [facility].     Iron deficiency anemia secondary to inadequate dietary iron intake  Reports history of anemia in the past. She is not currently on iron supplements. She has IUD mirena with amenorrhea. We will check CBC, iron panel with upcoming labs    Tobacco abuse  Reports she has been smoking cigarettes since age 17; currently at 3 cigs/day. She is working on quitting. No cough or chest pain reported today    Morbid obesity with BMI of 45.0-49.9, adult ([facility])  Body mass index is 49.62 kg/m².  Chronic struggle with weight. She is interested in weight loss options which we discussed today. She is leaning more towards GLP-1 medication for weight loss, not interested in bariatric surgery at this time. We will check A1C, lipid, CMP, TSH with upcoming labs, at follow up can decide on medication for weight loss.     Vitamin D deficiency  Recalls having low vit D in the past. She is not currently on supplements. Will check vit D level with upcoming labs.    Active concern today - reports dark skin patches anterior part of her lower legs. Sometimes it itches. She has tried hydrocortisone cream without relief. She would like to see derm for this.     Patient Active Problem List   Diagnosis    Family history of diabetes mellitus    Anemia    Morbid obesity with BMI of 45.0-49.9, adult ([facility])    Tobacco abuse    ZAHRA (obstructive sleep apnea)       Past Medical History:   Diagnosis Date    Anemia 2/2011    Blood transfusion 2/2011 2/2 anemia    Headache(784.0)     Migraine         Current Outpatient Medications Ordered in Epic   Medication Sig Dispense Refill    acetaminophen (TYLENOL) 500  "MG Tab Take 500-1,000 mg by mouth every 6 hours as needed.       No current Epic-ordered facility-administered medications on file.        Past Surgical History:   Procedure Laterality Date    BEAR BY LAPAROSCOPY  2/15/2011    Performed by LALITA ALANIS at SURGERY [facility] ORS    DILATION AND CURETTAGE          Allergies:  Imitrex [sumatriptan]    Health Maintenance: Completed  Last mammogram in 2019, no malignancy. Would like to repeat this year.   ; Sees [facility]; IUD mirena inserted ; amenorrhea with IUD; Last PAP 3/19/2019, NILM, -ve HPV.   Declined colon cancer screening for now.     ROS:   Review of Systems   Constitutional: Negative.  Negative for fever and malaise/fatigue.   HENT: Negative.     Eyes: Negative.    Respiratory:  Negative for cough, sputum production and shortness of breath.    Cardiovascular:  Negative for chest pain, palpitations and leg swelling.   Gastrointestinal: Negative.    Genitourinary: Negative.    Musculoskeletal: Negative.    Skin:  Positive for rash (dark patches lower legs).   Neurological: Negative.    Endo/Heme/Allergies: Negative.    Psychiatric/Behavioral: Negative.           Objective       Exam: /78   Pulse 71   Temp 36.3 °C (97.4 °F) (Temporal)   Resp 14   Ht 1.676 m (5' 6\")   Wt (!) 139 kg (307 lb 6.4 oz)   SpO2 97%  Body mass index is 49.62 kg/m².    Physical Exam  Constitutional:       Appearance: Normal appearance.   Cardiovascular:      Rate and Rhythm: Normal rate and regular rhythm.      Pulses: Normal pulses.      Heart sounds: Normal heart sounds.   Pulmonary:      Effort: Pulmonary effort is normal.      Breath sounds: Normal breath sounds.   Musculoskeletal:         General: Normal range of motion.      Cervical back: Normal range of motion and neck supple.      Right lower leg: No edema.      Left lower leg: No edema.   Skin:     General: Skin is warm and dry.             Comments: Hyperpigmented skin patches   Neurological:     "  General: No focal deficit present.      Mental Status: She is alert and oriented to person, place, and time.   Psychiatric:         Mood and Affect: Mood normal.         Behavior: Behavior normal.         Thought Content: Thought content normal.         Judgment: Judgment normal.         Assessment & Plan     43 y.o. female with the following -  1. Encounter for screening mammogram for malignant neoplasm of breast  MA-SCREENING MAMMO BILAT W/TOMOSYNTHESIS W/CAD      2. Iron deficiency anemia secondary to inadequate dietary iron intake  CBC WITHOUT DIFFERENTIAL    IRON/TOTAL IRON BIND      3. Morbid obesity with BMI of 45.0-49.9, adult (HCC)  Comp Metabolic Panel    HEMOGLOBIN A1C    Lipid Profile    TSH WITH REFLEX TO FT4    Patient identified as having weight management issue.  Appropriate orders and counseling given.      4. Vitamin D deficiency  VITAMIN D,25 HYDROXY (DEFICIENCY)      5. Screening for metabolic disorder  Comp Metabolic Panel    HEMOGLOBIN A1C    Lipid Profile    TSH WITH REFLEX TO FT4      6. Hyperpigmentation of skin  Referral to Dermatology      7. Need for hepatitis C screening test  HEP C VIRUS ANTIBODY      8. ZAHRA (obstructive sleep apnea)        9. Tobacco abuse        10. Encounter to establish care          Educated in proper administration of medication(s) ordered today including safety, possible SE, risks, benefits, rationale and alternatives to therapy.     Return in about 4 weeks (around 6/28/2024) for establish care.    Please note that this dictation was created using voice recognition software. I have made every reasonable attempt to correct obvious errors, but I expect that there are errors of grammar and possibly content that I did not discover before finalizing the note.

## 2024-06-01 ENCOUNTER — HOSPITAL ENCOUNTER (OUTPATIENT)
Dept: LAB | Facility: MEDICAL CENTER | Age: 44
End: 2024-06-01
Attending: NURSE PRACTITIONER
Payer: COMMERCIAL

## 2024-06-01 DIAGNOSIS — E55.9 VITAMIN D DEFICIENCY: ICD-10-CM

## 2024-06-01 DIAGNOSIS — Z13.228 SCREENING FOR METABOLIC DISORDER: ICD-10-CM

## 2024-06-01 DIAGNOSIS — D50.8 IRON DEFICIENCY ANEMIA SECONDARY TO INADEQUATE DIETARY IRON INTAKE: ICD-10-CM

## 2024-06-01 DIAGNOSIS — E66.01 MORBID OBESITY WITH BMI OF 45.0-49.9, ADULT (HCC): ICD-10-CM

## 2024-06-01 DIAGNOSIS — Z11.59 NEED FOR HEPATITIS C SCREENING TEST: ICD-10-CM

## 2024-06-01 LAB
25(OH)D3 SERPL-MCNC: 15 NG/ML (ref 30–100)
ALBUMIN SERPL BCP-MCNC: 3.8 G/DL (ref 3.2–4.9)
ALBUMIN/GLOB SERPL: 1 G/DL
ALP SERPL-CCNC: 78 U/L (ref 30–99)
ALT SERPL-CCNC: 9 U/L (ref 2–50)
ANION GAP SERPL CALC-SCNC: 11 MMOL/L (ref 7–16)
AST SERPL-CCNC: 13 U/L (ref 12–45)
BILIRUB SERPL-MCNC: 0.2 MG/DL (ref 0.1–1.5)
BUN SERPL-MCNC: 13 MG/DL (ref 8–22)
CALCIUM ALBUM COR SERPL-MCNC: 9.4 MG/DL (ref 8.5–10.5)
CALCIUM SERPL-MCNC: 9.2 MG/DL (ref 8.5–10.5)
CHLORIDE SERPL-SCNC: 104 MMOL/L (ref 96–112)
CHOLEST SERPL-MCNC: 114 MG/DL (ref 100–199)
CO2 SERPL-SCNC: 22 MMOL/L (ref 20–33)
CREAT SERPL-MCNC: 0.77 MG/DL (ref 0.5–1.4)
ERYTHROCYTE [DISTWIDTH] IN BLOOD BY AUTOMATED COUNT: 47.6 FL (ref 35.9–50)
EST. AVERAGE GLUCOSE BLD GHB EST-MCNC: 111 MG/DL
FASTING STATUS PATIENT QL REPORTED: NORMAL
GFR SERPLBLD CREATININE-BSD FMLA CKD-EPI: 96 ML/MIN/1.73 M 2
GLOBULIN SER CALC-MCNC: 3.7 G/DL (ref 1.9–3.5)
GLUCOSE SERPL-MCNC: 109 MG/DL (ref 65–99)
HBA1C MFR BLD: 5.5 % (ref 4–5.6)
HCT VFR BLD AUTO: 44.3 % (ref 37–47)
HCV AB SER QL: NORMAL
HDLC SERPL-MCNC: 34 MG/DL
HGB BLD-MCNC: 14.4 G/DL (ref 12–16)
IRON SATN MFR SERPL: 22 % (ref 15–55)
IRON SERPL-MCNC: 60 UG/DL (ref 40–170)
LDLC SERPL CALC-MCNC: 71 MG/DL
MCH RBC QN AUTO: 31.4 PG (ref 27–33)
MCHC RBC AUTO-ENTMCNC: 32.5 G/DL (ref 32.2–35.5)
MCV RBC AUTO: 96.5 FL (ref 81.4–97.8)
PLATELET # BLD AUTO: 310 K/UL (ref 164–446)
PMV BLD AUTO: 9 FL (ref 9–12.9)
POTASSIUM SERPL-SCNC: 4.9 MMOL/L (ref 3.6–5.5)
PROT SERPL-MCNC: 7.5 G/DL (ref 6–8.2)
RBC # BLD AUTO: 4.59 M/UL (ref 4.2–5.4)
SODIUM SERPL-SCNC: 137 MMOL/L (ref 135–145)
TIBC SERPL-MCNC: 276 UG/DL (ref 250–450)
TRIGL SERPL-MCNC: 46 MG/DL (ref 0–149)
TSH SERPL DL<=0.005 MIU/L-ACNC: 1.76 UIU/ML (ref 0.38–5.33)
UIBC SERPL-MCNC: 216 UG/DL (ref 110–370)
WBC # BLD AUTO: 9.2 K/UL (ref 4.8–10.8)

## 2024-06-01 PROCEDURE — 80053 COMPREHEN METABOLIC PANEL: CPT

## 2024-06-01 PROCEDURE — 82306 VITAMIN D 25 HYDROXY: CPT

## 2024-06-01 PROCEDURE — 83540 ASSAY OF IRON: CPT

## 2024-06-01 PROCEDURE — 83036 HEMOGLOBIN GLYCOSYLATED A1C: CPT

## 2024-06-01 PROCEDURE — 85027 COMPLETE CBC AUTOMATED: CPT

## 2024-06-01 PROCEDURE — 83550 IRON BINDING TEST: CPT

## 2024-06-01 PROCEDURE — 36415 COLL VENOUS BLD VENIPUNCTURE: CPT

## 2024-06-01 PROCEDURE — 84443 ASSAY THYROID STIM HORMONE: CPT

## 2024-06-01 PROCEDURE — 86803 HEPATITIS C AB TEST: CPT

## 2024-06-01 PROCEDURE — 80061 LIPID PANEL: CPT

## 2024-06-04 ENCOUNTER — OFFICE VISIT (OUTPATIENT)
Dept: MEDICAL GROUP | Facility: PHYSICIAN GROUP | Age: 44
End: 2024-06-04
Payer: COMMERCIAL

## 2024-06-04 VITALS
WEIGHT: 293 LBS | RESPIRATION RATE: 12 BRPM | HEART RATE: 64 BPM | TEMPERATURE: 98.3 F | BODY MASS INDEX: 48.82 KG/M2 | DIASTOLIC BLOOD PRESSURE: 78 MMHG | HEIGHT: 65 IN | OXYGEN SATURATION: 99 % | SYSTOLIC BLOOD PRESSURE: 130 MMHG

## 2024-06-04 DIAGNOSIS — Z12.4 SCREENING FOR CERVICAL CANCER: ICD-10-CM

## 2024-06-04 DIAGNOSIS — Z30.431 ENCOUNTER FOR MANAGEMENT OF INTRAUTERINE CONTRACEPTIVE DEVICE (IUD), UNSPECIFIED IUD MANAGEMENT TYPE: ICD-10-CM

## 2024-06-04 DIAGNOSIS — M79.89 LEG SWELLING: ICD-10-CM

## 2024-06-04 DIAGNOSIS — G47.33 OSA (OBSTRUCTIVE SLEEP APNEA): Chronic | ICD-10-CM

## 2024-06-04 DIAGNOSIS — E55.9 VITAMIN D DEFICIENCY: ICD-10-CM

## 2024-06-04 PROCEDURE — 99214 OFFICE O/P EST MOD 30 MIN: CPT

## 2024-06-04 PROCEDURE — 3078F DIAST BP <80 MM HG: CPT

## 2024-06-04 PROCEDURE — 3075F SYST BP GE 130 - 139MM HG: CPT

## 2024-06-04 RX ORDER — ERGOCALCIFEROL 1.25 MG/1
50000 CAPSULE ORAL
Qty: 12 CAPSULE | Refills: 3 | Status: SHIPPED | OUTPATIENT
Start: 2024-06-04

## 2024-06-04 ASSESSMENT — ENCOUNTER SYMPTOMS
BLOOD IN STOOL: 0
NAUSEA: 0
WEIGHT LOSS: 0
PALPITATIONS: 0
COUGH: 0
CHILLS: 0
WHEEZING: 0
TINGLING: 0
ABDOMINAL PAIN: 0
FEVER: 0
SHORTNESS OF BREATH: 0
VOMITING: 0
DIARRHEA: 0
DIZZINESS: 0
CONSTIPATION: 0
HEADACHES: 0

## 2024-06-04 ASSESSMENT — FIBROSIS 4 INDEX: FIB4 SCORE: 0.63

## 2024-07-31 ENCOUNTER — TELEPHONE (OUTPATIENT)
Dept: HEALTH INFORMATION MANAGEMENT | Facility: OTHER | Age: 44
End: 2024-07-31
Payer: COMMERCIAL

## 2024-08-06 ENCOUNTER — APPOINTMENT (OUTPATIENT)
Dept: RADIOLOGY | Facility: MEDICAL CENTER | Age: 44
End: 2024-08-06
Attending: NURSE PRACTITIONER
Payer: COMMERCIAL

## 2024-09-03 ENCOUNTER — HOSPITAL ENCOUNTER (OUTPATIENT)
Dept: RADIOLOGY | Facility: MEDICAL CENTER | Age: 44
End: 2024-09-03
Attending: NURSE PRACTITIONER
Payer: COMMERCIAL

## 2024-09-03 DIAGNOSIS — Z12.31 ENCOUNTER FOR SCREENING MAMMOGRAM FOR MALIGNANT NEOPLASM OF BREAST: ICD-10-CM

## 2024-09-03 PROCEDURE — 77067 SCR MAMMO BI INCL CAD: CPT

## 2024-09-04 ENCOUNTER — OFFICE VISIT (OUTPATIENT)
Dept: URGENT CARE | Facility: PHYSICIAN GROUP | Age: 44
End: 2024-09-04
Payer: COMMERCIAL

## 2024-09-04 VITALS
WEIGHT: 293 LBS | HEART RATE: 64 BPM | SYSTOLIC BLOOD PRESSURE: 126 MMHG | HEIGHT: 65 IN | TEMPERATURE: 97.4 F | OXYGEN SATURATION: 99 % | RESPIRATION RATE: 18 BRPM | DIASTOLIC BLOOD PRESSURE: 88 MMHG | BODY MASS INDEX: 48.82 KG/M2

## 2024-09-04 DIAGNOSIS — M54.50 LUMBAR PAIN WITH RADIATION DOWN LEFT LEG: ICD-10-CM

## 2024-09-04 DIAGNOSIS — N30.90 CYSTITIS: ICD-10-CM

## 2024-09-04 DIAGNOSIS — M79.605 LUMBAR PAIN WITH RADIATION DOWN LEFT LEG: ICD-10-CM

## 2024-09-04 PROCEDURE — 3074F SYST BP LT 130 MM HG: CPT | Performed by: PHYSICIAN ASSISTANT

## 2024-09-04 PROCEDURE — 99214 OFFICE O/P EST MOD 30 MIN: CPT | Performed by: PHYSICIAN ASSISTANT

## 2024-09-04 PROCEDURE — 3079F DIAST BP 80-89 MM HG: CPT | Performed by: PHYSICIAN ASSISTANT

## 2024-09-04 RX ORDER — CYCLOBENZAPRINE HCL 5 MG
5-10 TABLET ORAL 3 TIMES DAILY PRN
Qty: 30 TABLET | Refills: 0 | Status: SHIPPED | OUTPATIENT
Start: 2024-09-04

## 2024-09-04 RX ORDER — NITROFURANTOIN 25; 75 MG/1; MG/1
100 CAPSULE ORAL 2 TIMES DAILY
Qty: 10 CAPSULE | Refills: 0 | Status: SHIPPED | OUTPATIENT
Start: 2024-09-04

## 2024-09-04 RX ORDER — METHYLPREDNISOLONE 4 MG
TABLET, DOSE PACK ORAL
Qty: 21 TABLET | Refills: 0 | Status: SHIPPED | OUTPATIENT
Start: 2024-09-04

## 2024-09-04 ASSESSMENT — ENCOUNTER SYMPTOMS
NEUROLOGICAL NEGATIVE: 1
FALLS: 0
NAUSEA: 0
PARESTHESIAS: 0
VOMITING: 0
WEAKNESS: 0
PERIANAL NUMBNESS: 0
FEVER: 0
CARDIOVASCULAR NEGATIVE: 1
PARESIS: 0
ABDOMINAL PAIN: 0
FLANK PAIN: 0
DIARRHEA: 0
NUMBNESS: 0
RESPIRATORY NEGATIVE: 1
HEADACHES: 0
CONSTITUTIONAL NEGATIVE: 1
LEG PAIN: 0
TINGLING: 0
BACK PAIN: 1
BOWEL INCONTINENCE: 0

## 2024-09-04 ASSESSMENT — FIBROSIS 4 INDEX: FIB4 SCORE: 0.63

## 2024-09-05 NOTE — PROGRESS NOTES
Subjective     Rose Qureshi is a very pleasant 43 y.o. female who presents with Back Pain (Lower back px, pinching sensation, pain when putting pressure on left leg x 1 day )            Back Pain  This is a new problem. The current episode started yesterday. The problem occurs constantly. The problem is unchanged. The pain is present in the lumbar spine. The quality of the pain is described as shooting. The pain radiates to the left thigh. The symptoms are aggravated by standing and sitting. Pertinent negatives include no abdominal pain, bladder incontinence, bowel incontinence, chest pain, dysuria, fever, headaches, leg pain, numbness, paresis, paresthesias, pelvic pain, perianal numbness, tingling or weakness. She has tried home exercises and analgesics for the symptoms. The treatment provided mild relief.       PMH:  has a past medical history of Anemia (2/2011), Blood transfusion (2/2011), Headache(784.0), and Migraine.    She has no past medical history of Allergy, Anxiety, Arrhythmia, Arthritis, ASTHMA, Cancer ([facility]), CATARACT, CHF (congestive heart failure) ([facility]), Clotting disorder ([facility]), COPD, Depression, Diabetes, EMPHYSEMA, GERD (gastroesophageal reflux disease), Glaucoma, Goiter, Heart attack ([facility]), Heart murmur, HIV (human immunodeficiency virus infection), Hyperlipidemia, Hypertension, IBD (inflammatory bowel disease), Kidney disease, Muscle disorder, OSTEOPOROSIS, Parathyroid disorder ([facility]), Pituitary disease ([facility]), Seizure ([facility]), Stroke ([facility]), Substance abuse ([facility]), Thyroid disease, Tuberculosis, Ulcer, or Urinary tract infection, site not specified.  MEDS:   Current Outpatient Medications:     ergocalciferol (DRISDOL) 67236 UNIT capsule, Take 1 Capsule by mouth every 7 days., Disp: 12 Capsule, Rfl: 3    acetaminophen (TYLENOL) 500 MG Tab, Take 500-1,000 mg by mouth every 6 hours as needed., Disp: , Rfl:   ALLERGIES: No Known Allergies  SURGHX:   Past Surgical History:   Procedure Laterality  "Date    BEAR BY LAPAROSCOPY  02/15/2011    Performed by LALITA ALANIS at SURGERY [facility] ORS    DILATION AND CURETTAGE  01/01/2006    CHOLECYSTECTOMY       SOCHX:  reports that she has been smoking cigarettes. She started smoking about 33 years ago. She has a 6 pack-year smoking history. She has never used smokeless tobacco. She reports current alcohol use. She reports that she does not use drugs.  FH: family history includes Cancer in her maternal grandfather and mother; Diabetes in her brother and maternal grandmother; Hyperlipidemia in her maternal grandmother; Hypertension in her brother and maternal grandmother; Lung Cancer in her mother; Other in her father; Stroke in her mother.      Review of Systems   Constitutional: Negative.  Negative for fever.   Respiratory: Negative.     Cardiovascular: Negative.  Negative for chest pain.   Gastrointestinal:  Negative for abdominal pain, bowel incontinence, diarrhea, nausea and vomiting.   Genitourinary:  Positive for frequency. Negative for bladder incontinence, dysuria, flank pain, hematuria, pelvic pain and urgency.   Musculoskeletal:  Positive for back pain. Negative for falls.   Neurological: Negative.  Negative for tingling, weakness, numbness, headaches and paresthesias.       Medications, Allergies, and current problem list reviewed today in Epic           Objective     /88   Pulse 64   Temp 36.3 °C (97.4 °F) (Temporal)   Resp 18   Ht 1.651 m (5' 5\")   Wt (!) 137 kg (301 lb 1.6 oz)   SpO2 99%   BMI 50.11 kg/m²      Physical Exam  Vitals and nursing note reviewed.   Constitutional:       General: She is not in acute distress.     Appearance: Normal appearance. She is well-developed. She is not ill-appearing, toxic-appearing or diaphoretic.   HENT:      Head: Normocephalic and atraumatic.      Right Ear: Hearing and external ear normal.      Left Ear: Hearing and external ear normal.      Nose: Nose normal.   Eyes:      General:         Right " eye: No discharge.         Left eye: No discharge.      Conjunctiva/sclera: Conjunctivae normal.   Cardiovascular:      Rate and Rhythm: Normal rate and regular rhythm.      Pulses: Normal pulses.      Heart sounds: Normal heart sounds.   Pulmonary:      Effort: Pulmonary effort is normal. No respiratory distress.      Breath sounds: Normal breath sounds. No wheezing, rhonchi or rales.   Abdominal:      General: Abdomen is flat. There is no distension.      Palpations: Abdomen is soft.      Tenderness: There is no abdominal tenderness. There is no right CVA tenderness, left CVA tenderness, guarding or rebound.   Musculoskeletal:      Cervical back: Normal range of motion and neck supple.      Lumbar back: Swelling, spasms and tenderness present. No edema, deformity, signs of trauma or bony tenderness. Decreased range of motion. Negative right straight leg raise test and negative left straight leg raise test. No scoliosis.      Right lower leg: No edema.      Left lower leg: No edema.      Comments: Left lumbar paraspinal muscular TTP radiating into the SI joint and gluteal region.  Localized edema and spasming noted.  No midline or bony tenderness.  No gross deformity, lesions, erythema, or ecchymosis.  Lower extremity strength and DTRs equal.  Gait appropriate.  Forward flexion limited secondary to pain.   Skin:     General: Skin is warm and dry.   Neurological:      General: No focal deficit present.      Mental Status: She is alert and oriented to person, place, and time.   Psychiatric:         Mood and Affect: Mood normal.         Behavior: Behavior normal.         Thought Content: Thought content normal.         Judgment: Judgment normal.                             Assessment & Plan     This is a very pleasant 43-year-old female presenting with left-sided lumbar muscular tenderness with sciatica.  History of similar in the past.  There is no injury fall or precipitating event.  She denies fever, bowel or  bladder incontinence, saddle anesthesia, abdominal pain or chest pain.  Symptoms are worse with movement and position.  Eating and drinking normal without vomiting or diarrhea.  Patient denies UTI symptoms.  Vital signs normal.  Exam shows left lumbar muscular tenderness and spasming into the left SI joint.  Lower extremity strength DTRs and gait appropriate.  No gross deformity, ecchymosis or bony tenderness.  Abdominal exam benign and there is no flank pain.  Interestingly, urinalysis did show dark, cloudy urine with positive blood and nitrates.  However, patient vehemently declines UTI symptoms.  Presentation more consistent with a lumbar strain with sciatica as she has had a history of similar in the past.  We will treat as such with prescription medication, rest, heat, ice, stretches, etc.  Given urinalysis findings she was given an antibiotic although again clinical concern for cystitis or pyelonephritis is low.     Assessment & Plan  Lumbar pain with radiation down left leg    Orders:    methylPREDNISolone (MEDROL DOSEPAK) 4 MG Tablet Therapy Pack; Follow schedule on package instructions.    cyclobenzaprine (FLEXERIL) 5 mg tablet; Take 1-2 Tablets by mouth 3 times a day as needed for Muscle Spasms or Moderate Pain.    Cystitis    Orders:    nitrofurantoin (MACROBID) 100 MG Cap; Take 1 Capsule by mouth 2 times a day.          I personally reviewed prior external notes and test results pertinent to today's visit. Return to clinic or go to ED if symptoms worsen or persist. Red flag symptoms and indications for ED discussed at length. Patient/Parent/Guardian voices understanding.  AVS with post-visit instructions printed and provided or given verbally.  Follow-up with your primary care provider in 3-5 days. All side effects and potential interactions of prescribed medication discussed including allergic response, GI upset, tendon injury, rash, sedation, OCP effectiveness, etc.    Please note that this dictation  was created using voice recognition software. I have made every reasonable attempt to correct obvious errors, but I expect that there are errors of grammar and possibly content that I did not discover before finalizing the note.

## 2024-11-04 ENCOUNTER — OFFICE VISIT (OUTPATIENT)
Dept: URGENT CARE | Facility: PHYSICIAN GROUP | Age: 44
End: 2024-11-04
Payer: COMMERCIAL

## 2024-11-04 VITALS
WEIGHT: 293 LBS | RESPIRATION RATE: 19 BRPM | BODY MASS INDEX: 47.09 KG/M2 | HEIGHT: 66 IN | DIASTOLIC BLOOD PRESSURE: 76 MMHG | OXYGEN SATURATION: 97 % | SYSTOLIC BLOOD PRESSURE: 110 MMHG | TEMPERATURE: 96.7 F | HEART RATE: 87 BPM

## 2024-11-04 DIAGNOSIS — G43.909 MIGRAINE WITHOUT STATUS MIGRAINOSUS, NOT INTRACTABLE, UNSPECIFIED MIGRAINE TYPE: ICD-10-CM

## 2024-11-04 PROCEDURE — 99214 OFFICE O/P EST MOD 30 MIN: CPT | Performed by: STUDENT IN AN ORGANIZED HEALTH CARE EDUCATION/TRAINING PROGRAM

## 2024-11-04 PROCEDURE — 3074F SYST BP LT 130 MM HG: CPT | Performed by: STUDENT IN AN ORGANIZED HEALTH CARE EDUCATION/TRAINING PROGRAM

## 2024-11-04 PROCEDURE — 3078F DIAST BP <80 MM HG: CPT | Performed by: STUDENT IN AN ORGANIZED HEALTH CARE EDUCATION/TRAINING PROGRAM

## 2024-11-04 RX ORDER — BUTALBITAL, ACETAMINOPHEN AND CAFFEINE 300; 40; 50 MG/1; MG/1; MG/1
1 CAPSULE ORAL EVERY 4 HOURS PRN
Qty: 10 CAPSULE | Refills: 0 | Status: SHIPPED | OUTPATIENT
Start: 2024-11-04 | End: 2024-11-07

## 2024-11-04 ASSESSMENT — FIBROSIS 4 INDEX: FIB4 SCORE: 0.64

## 2024-11-04 NOTE — LETTER
November 4, 2024    To Whom It May Concern:         This is confirmation that Ralf Robbins Kerriarslan attended her scheduled appointment with Lon Landaverde P.A.-C. on 11/04/24.  Excused from work on 11/4/2024.         If you have any questions please do not hesitate to call me at the phone number listed below.    Sincerely,          Lon Landaverde P.A.-C.  [phone]                  
clear cerebral spinal fluid

## 2024-11-04 NOTE — PROGRESS NOTES
Subjective:   Ralf Qureshi is a 44 y.o. female who presents for Migraine (X 3 days)      HPI:  44-year-old female with a past medical history of migraines for over 15 years presents to urgent care for migraine for the past 3 days.  States that the quality of migraine is similar to past ones.  She used to take sumatriptan and Toradol for this but found that sumatriptan 100 mg did not improve her migraines and she did not have good relief with Toradol.  Has tried OTC medications without improvement.  Headache came on gradually.  Not having any fever, chills, blurred vision, double vision, tremor, weakness, or balance disturbance.  Does have some light sensitivity and nausea but no vomiting.  No head trauma or falls.    Medications:    acetaminophen Tabs  acetaminophen/caffeine/butalbital 300-40-50 mg Caps  cyclobenzaprine  ergocalciferol  methylPREDNISolone Tbpk  nitrofurantoin Caps    Allergies: Patient has no known allergies.    Problem List: Ralf Qureshi does not have any pertinent problems on file.    Surgical History:  Past Surgical History:   Procedure Laterality Date    BEAR BY LAPAROSCOPY  02/15/2011    Performed by LALITA ALANIS at SUR[facility]    DILATION AND CURETTAGE  01/01/2006    CHOLECYSTECTOMY         Past Social Hx: Ralf Qureshi  reports that she has been smoking cigarettes. She started smoking about 34 years ago. She has a 6 pack-year smoking history. She has never used smokeless tobacco. She reports current alcohol use. She reports that she does not use drugs.     Past Family Hx:  Ralf Qureshi family history includes Cancer in her maternal grandfather and mother; Diabetes in her brother and maternal grandmother; Hyperlipidemia in her maternal grandmother; Hypertension in her brother and maternal grandmother; Lung Cancer in her mother; Other in her father; Stroke in her mother.     Problem list, medications, and allergies reviewed by  "myself today in Epic.     Objective:     /76   Pulse 87   Temp 35.9 °C (96.7 °F)   Resp 19   Ht 1.664 m (5' 5.5\")   Wt (!) 135 kg (297 lb 12.8 oz)   SpO2 97%   BMI 48.80 kg/m²     Physical Exam  Vitals reviewed.   HENT:      Head: Atraumatic.      Nose: Nose normal.      Mouth/Throat:      Mouth: Mucous membranes are moist.   Eyes:      Pupils: Pupils are equal, round, and reactive to light.   Cardiovascular:      Rate and Rhythm: Normal rate.      Pulses: Normal pulses.   Pulmonary:      Effort: Pulmonary effort is normal.   Neurological:      General: No focal deficit present.      Mental Status: She is alert and oriented to person, place, and time.      Cranial Nerves: Cranial nerves 2-12 are intact. No facial asymmetry.      Sensory: Sensation is intact.      Motor: Motor function is intact.      Coordination: Coordination is intact.      Gait: Gait is intact.         Assessment/Plan:     Diagnosis and associated orders:     1. Migraine without status migrainosus, not intractable, unspecified migraine type  Referral to Neurology    acetaminophen/caffeine/butalbital 300-40-50 mg (FIORICET) -40 MG Cap capsule         Comments/MDM:     Patient's presentation and physical exam findings are consistent with migraine headache that is consistent with her typical pattern.  States that she is not established with neurology but would like referral at this time.  Referral placed.  She has not had relief with sumatriptan.  She is typically not had good relief with Toradol.  Offered injection of Toradol today to try despite her past history.  Patient declined at this time.  She has tried OTC medications without improvement.  Her vitals are stable.  No red flag signs.  No indication for advanced imaging today.  Will try Fioricet to see if this will alleviate her migraine.  Follow-up if symptoms continue or worsen.         Differential diagnosis, natural history, supportive care, and indications for immediate " follow-up discussed.    Advised the patient to follow-up with the primary care physician for recheck, reevaluation, and consideration of further management.    Please note that this dictation was created using voice recognition software. I have made a reasonable attempt to correct obvious errors, but I expect that there are errors of grammar and possibly content that I did not discover before finalizing the note.    Electronically signed by Lon Landaverde PA-C.

## 2024-11-08 ENCOUNTER — APPOINTMENT (OUTPATIENT)
Dept: MEDICAL GROUP | Facility: PHYSICIAN GROUP | Age: 44
End: 2024-11-08
Payer: COMMERCIAL

## 2024-11-18 ENCOUNTER — TELEPHONE (OUTPATIENT)
Dept: HEALTH INFORMATION MANAGEMENT | Facility: OTHER | Age: 44
End: 2024-11-18
Payer: COMMERCIAL

## 2024-12-19 ENCOUNTER — APPOINTMENT (OUTPATIENT)
Dept: NEUROLOGY | Facility: MEDICAL CENTER | Age: 44
End: 2024-12-19
Attending: PSYCHIATRY & NEUROLOGY
Payer: COMMERCIAL

## 2025-01-10 ENCOUNTER — TELEPHONE (OUTPATIENT)
Dept: NEUROLOGY | Facility: MEDICAL CENTER | Age: 45
End: 2025-01-10
Payer: COMMERCIAL

## 2025-01-10 NOTE — TELEPHONE ENCOUNTER
NEUROLOGY PATIENT PRE-VISIT PLANNING     Patient was NOT contacted to complete PVP.  Note: Patient will not be contacted if there is no indication to call.     Patient Appointment is scheduled as: New Patient     Is visit type and length scheduled correctly? Yes    [employer] Patient is checked in Patient Demographics? Yes    3.   Is referral attached to visit? Yes    4. Were records received from referring provider? Yes    4. Patient was NOT contacted to have someone accompany them to visit.     5. Is this appointment scheduled as a Hospital Follow-Up?  Yes, urgent care     6. Does the patient require any pre procedure or post procedure follow up? No    7. If any orders were placed at last visit or intended to be done for this visit do we have Results/Consult Notes? No  Labs - Labs were not ordered at last office visit.  Imaging - Imaging was not ordered at last office visit.  Referrals - No referrals were ordered at last office visit.  Note: If patient appointment is for lab or imaging review and patient did not complete the studies, check with provider if OK to reschedule patient until completed.    8. If patient appointment is for Botox - is order pended for provider? N/A    9. Was Plan Assessment from last Neurology Office Visit Reviewed?  No

## 2025-01-24 ENCOUNTER — HOSPITAL ENCOUNTER (OUTPATIENT)
Facility: MEDICAL CENTER | Age: 45
End: 2025-01-24
Payer: COMMERCIAL

## 2025-01-24 ENCOUNTER — APPOINTMENT (OUTPATIENT)
Dept: MEDICAL GROUP | Facility: PHYSICIAN GROUP | Age: 45
End: 2025-01-24
Payer: COMMERCIAL

## 2025-01-24 VITALS
HEIGHT: 65 IN | RESPIRATION RATE: 20 BRPM | BODY MASS INDEX: 48.82 KG/M2 | TEMPERATURE: 97.6 F | SYSTOLIC BLOOD PRESSURE: 122 MMHG | DIASTOLIC BLOOD PRESSURE: 78 MMHG | HEART RATE: 64 BPM | WEIGHT: 293 LBS | OXYGEN SATURATION: 98 %

## 2025-01-24 DIAGNOSIS — Z71.3 ENCOUNTER FOR WEIGHT LOSS COUNSELING: ICD-10-CM

## 2025-01-24 DIAGNOSIS — Z79.899 ENCOUNTER FOR MEDICATION MANAGEMENT: ICD-10-CM

## 2025-01-24 DIAGNOSIS — M79.89 LEG SWELLING: ICD-10-CM

## 2025-01-24 PROCEDURE — 99214 OFFICE O/P EST MOD 30 MIN: CPT

## 2025-01-24 PROCEDURE — 80307 DRUG TEST PRSMV CHEM ANLYZR: CPT

## 2025-01-24 PROCEDURE — 3074F SYST BP LT 130 MM HG: CPT

## 2025-01-24 PROCEDURE — 3078F DIAST BP <80 MM HG: CPT

## 2025-01-24 RX ORDER — PHENTERMINE HYDROCHLORIDE 15 MG/1
15 CAPSULE ORAL EVERY MORNING
Qty: 30 CAPSULE | Refills: 0 | Status: SHIPPED | OUTPATIENT
Start: 2025-01-24 | End: 2025-02-23

## 2025-01-24 ASSESSMENT — PATIENT HEALTH QUESTIONNAIRE - PHQ9: CLINICAL INTERPRETATION OF PHQ2 SCORE: 0

## 2025-01-24 ASSESSMENT — FIBROSIS 4 INDEX: FIB4 SCORE: 0.64

## 2025-01-24 NOTE — PROGRESS NOTES
Subjective:     Chief Complaint   Patient presents with    Weight Gain    Foot Swelling     History of Present Illness  The patient is a 44-year-old female here to follow up on health concerns.    She presents with complaints of lower extremity edema and weight gain, which have been persistent for the past 2 months. Despite attempts at portion control, walking, and maintaining an active lifestyle, she has not observed any improvement.     She has been adhering to a weekly regimen of vitamin D supplements. She has implemented dietary modifications, including intermittent fasting, which was effective for a brief period of 1 to 2 weeks. She has also reduced her rice intake from 1 cup to half a cup per day, decreased her carbohydrate consumption, and eliminated sweets from her diet. Her coffee is now prepared with sugar-free creamer or consumed black with sweetener. These changes have resulted in a slight increase in her energy levels.     Her exercise routine includes treadmill workouts, neighborhood walks, and weightlifting, with walking performed 4 times a week and weightlifting 2 to 3 times a week. She estimates her daily step count to be at least 2000. She has not been monitoring her caloric intake. She has expressed interest in bariatric surgery, a procedure that some of her family members have undergone.    She reports chronic discoloration in both legs, accompanied by severe itching, particularly at night. She has observed swelling in her ankles, which persists regardless of whether she spends her work shift sitting or standing. She has been utilizing compression stockings and a sit-stand desk at her workplace. She was seen last year for leg swelling.    Supplemental Information  She has been using Mirena for 5 years and has not had a menstrual period during this time.    FAMILY HISTORY  A couple of her family members have had bariatric surgery due to health concerns like diabetes and high blood  "pressure.    MEDICATIONS  Current: vitamin D    Allergies: Patient has no known allergies.  ROS per HPI  Health Maintenance: Deferred   Objective:     /78 (BP Location: Left arm, Patient Position: Sitting, BP Cuff Size: Large adult)   Pulse 64   Temp 36.4 °C (97.6 °F) (Temporal)   Resp 20   Ht 1.651 m (5' 5\")   Wt (!) 139 kg (306 lb)   SpO2 98%   Breastfeeding No   BMI 50.92 kg/m²  Body mass index is 50.92 kg/m².     Physical Exam  Vitals reviewed.   Constitutional:       General: She is not in acute distress.     Appearance: Normal appearance. She is obese. She is not ill-appearing.   Cardiovascular:      Rate and Rhythm: Normal rate and regular rhythm.      Comments: Slight discoloration and darkening of lower extremity skin surrounding the ankles bilaterally  Pulmonary:      Effort: Pulmonary effort is normal. No respiratory distress.   Musculoskeletal:      Right lower le+ Edema present.      Left lower le+ Edema present.   Skin:     Coloration: Skin is not jaundiced or pale.   Neurological:      General: No focal deficit present.      Mental Status: She is alert and oriented to person, place, and time.   Psychiatric:         Mood and Affect: Mood normal.         Behavior: Behavior normal.         Thought Content: Thought content normal.         Judgment: Judgment normal.        Results  Laboratory Studies  Fasting glucose was 5.5.    Results for orders placed or performed during the hospital encounter of 24   HEP C VIRUS ANTIBODY    Collection Time: 24  8:06 AM   Result Value Ref Range    Hepatitis C Antibody Non-Reactive Non-Reactive   IRON/TOTAL IRON BIND    Collection Time: 24  8:06 AM   Result Value Ref Range    Iron 60 40 - 170 ug/dL    Total Iron Binding 276 250 - 450 ug/dL    Unsat Iron Binding 216 110 - 370 ug/dL    % Saturation 22 15 - 55 %   CBC WITHOUT DIFFERENTIAL    Collection Time: 24  8:06 AM   Result Value Ref Range    WBC 9.2 4.8 - 10.8 K/uL    RBC " 4.59 4.20 - 5.40 M/uL    Hemoglobin 14.4 12.0 - 16.0 g/dL    Hematocrit 44.3 37.0 - 47.0 %    MCV 96.5 81.4 - 97.8 fL    MCH 31.4 27.0 - 33.0 pg    MCHC 32.5 32.2 - 35.5 g/dL    RDW 47.6 35.9 - 50.0 fL    Platelet Count 310 164 - 446 K/uL    MPV 9.0 9.0 - 12.9 fL   VITAMIN D,25 HYDROXY (DEFICIENCY)    Collection Time: 06/01/24  8:06 AM   Result Value Ref Range    25-Hydroxy   Vitamin D 25 15 (L) 30 - 100 ng/mL   TSH WITH REFLEX TO FT4    Collection Time: 06/01/24  8:06 AM   Result Value Ref Range    TSH 1.760 0.380 - 5.330 uIU/mL   Lipid Profile    Collection Time: 06/01/24  8:06 AM   Result Value Ref Range    Cholesterol,Tot 114 100 - 199 mg/dL    Triglycerides 46 0 - 149 mg/dL    HDL 34 (A) >=40 mg/dL    LDL 71 <100 mg/dL   HEMOGLOBIN A1C    Collection Time: 06/01/24  8:06 AM   Result Value Ref Range    Glycohemoglobin 5.5 4.0 - 5.6 %    Est Avg Glucose 111 mg/dL   Comp Metabolic Panel    Collection Time: 06/01/24  8:06 AM   Result Value Ref Range    Sodium 137 135 - 145 mmol/L    Potassium 4.9 3.6 - 5.5 mmol/L    Chloride 104 96 - 112 mmol/L    Co2 22 20 - 33 mmol/L    Anion Gap 11.0 7.0 - 16.0    Glucose 109 (H) 65 - 99 mg/dL    Bun 13 8 - 22 mg/dL    Creatinine 0.77 0.50 - 1.40 mg/dL    Calcium 9.2 8.5 - 10.5 mg/dL    Correct Calcium 9.4 8.5 - 10.5 mg/dL    AST(SGOT) 13 12 - 45 U/L    ALT(SGPT) 9 2 - 50 U/L    Alkaline Phosphatase 78 30 - 99 U/L    Total Bilirubin 0.2 0.1 - 1.5 mg/dL    Albumin 3.8 3.2 - 4.9 g/dL    Total Protein 7.5 6.0 - 8.2 g/dL    Globulin 3.7 (H) 1.9 - 3.5 g/dL    A-G Ratio 1.0 g/dL   FASTING STATUS    Collection Time: 06/01/24  8:06 AM   Result Value Ref Range    Fasting Status Fasting    ESTIMATED GFR    Collection Time: 06/01/24  8:06 AM   Result Value Ref Range    GFR (CKD-EPI) 96 >60 mL/min/1.73 m 2      Assessment and Plan:     The following treatment plan was discussed through shared decision making with the patient:    1. BMI 50.0-59.9, adult (HCC)  Controlled Substance Treatment  Agreement    URINE DRUG SCREEN    phentermine 15 MG capsule      2. Encounter for weight loss counseling  Controlled Substance Treatment Agreement    URINE DRUG SCREEN    phentermine 15 MG capsule      3. Encounter for medication management  Controlled Substance Treatment Agreement    URINE DRUG SCREEN    phentermine 15 MG capsule      4. Leg swelling  US-EXTREMITY VENOUS LOWER BILAT        Assessment & Plan  1. Weight management.  Her weight remains stable compared to the previous year. She is not prediabetic, although a single instance of elevated fasting glucose was noted, potentially due to dietary factors.   She is currently in the age range where perimenopause could be a contributing factor to her weight fluctuations. She was advised to maintain a daily caloric intake of approximately 1500 calories, with a focus on reducing carbohydrate consumption.   An increase in daily steps to 10,000 was recommended. She was encouraged to continue strength training exercises at least 3 times per week.   The potential benefits and risks of phentermine were discussed, including its stimulant properties and possible side effects such as increased heart rate and palpitations. She was advised to take the medication in the morning and to avoid coffee on the first day of use to monitor for any adverse reactions.   Risks & benefits were reviewed at length, patient stated verbal understanding.  CS & UDS obtained in office today.  Obtained and reviewed patient utilization report from [facility] pharmacy database on 1/24/2025 11:18 PM  prior to writing prescription for controlled substance II, III or IV per [place] bill . Based on assessment of the report,my physical exam if necessary and the patient's health problem, the prescription is medically necessary.   A prescription for phentermine 15 mg was provided, to be taken once daily in the morning. A drug screen will be conducted today, and a controlled substance agreement was  signed. A coupon card was provided to help offset the cost of the medication if insurance does not cover it. If she responds well to the medication but continues to feel hungry, a dosage increase may be considered at the next visit.    2. Lower extremity edema.  Her blood pressure readings are within normal limits. There is no evidence of pitting edema. She was advised to incorporate ankle pumps into her routine to promote blood circulation. An ultrasound of the lower extremities was ordered to investigate potential vascular issues contributing to the swelling and discoloration.    Return in about 4 weeks (around 2/21/2025) for Controlled Substance, Weight Loss Management.       Please note that this note was created using dictation with voice recognition software. I have made every reasonable attempt to correct obvious errors, but I expect that there are errors of grammar and possibly content that I did not discover before finalizing the note.    ANTONIO Mathias  [facility]  [facility]

## 2025-01-27 LAB
AMPHET CTO UR CFM-MCNC: NEGATIVE NG/ML
BARBITURATES CTO UR CFM-MCNC: NEGATIVE NG/ML
BENZODIAZ CTO UR CFM-MCNC: NEGATIVE NG/ML
CANNABINOIDS CTO UR CFM-MCNC: NEGATIVE NG/ML
COCAINE CTO UR CFM-MCNC: NEGATIVE NG/ML
CREAT UR-MCNC: 137.7 MG/DL (ref 20–400)
DRUG COMMENT 753798: NORMAL
METHADONE CTO UR CFM-MCNC: NEGATIVE NG/ML
OPIATES CTO UR CFM-MCNC: NEGATIVE NG/ML
PCP CTO UR CFM-MCNC: NEGATIVE NG/ML
PROPOXYPH CTO UR CFM-MCNC: NEGATIVE NG/ML

## 2025-01-30 ENCOUNTER — APPOINTMENT (OUTPATIENT)
Dept: NEUROLOGY | Facility: MEDICAL CENTER | Age: 45
End: 2025-01-30
Payer: COMMERCIAL

## 2025-03-02 ENCOUNTER — APPOINTMENT (OUTPATIENT)
Dept: RADIOLOGY | Facility: MEDICAL CENTER | Age: 45
End: 2025-03-02
Payer: COMMERCIAL

## 2025-03-10 ENCOUNTER — HOSPITAL ENCOUNTER (OUTPATIENT)
Dept: RADIOLOGY | Facility: MEDICAL CENTER | Age: 45
End: 2025-03-10
Payer: COMMERCIAL

## 2025-03-10 DIAGNOSIS — M79.89 LEG SWELLING: ICD-10-CM

## 2025-03-10 PROCEDURE — 93970 EXTREMITY STUDY: CPT

## 2025-03-10 PROCEDURE — 93970 EXTREMITY STUDY: CPT | Mod: 26 | Performed by: INTERNAL MEDICINE

## 2025-03-18 DIAGNOSIS — Z71.3 ENCOUNTER FOR WEIGHT LOSS COUNSELING: ICD-10-CM

## 2025-03-19 ENCOUNTER — RESULTS FOLLOW-UP (OUTPATIENT)
Dept: MEDICAL GROUP | Facility: PHYSICIAN GROUP | Age: 45
End: 2025-03-19
Payer: COMMERCIAL

## 2025-03-24 NOTE — Clinical Note
REFERRAL APPROVAL NOTICE         Sent on March 24, 2025                   Rose Qureshi  [address] [facility] [place] [address]                   Dear Ms. Qureshi,    After a careful review of the medical information and benefit coverage, [facility] has processed your referral. See below for additional details.    If applicable, you must be actively enrolled with your insurance for coverage of the authorized service. If you have any questions regarding your coverage, please contact your insurance directly.    REFERRAL INFORMATION   Referral #:  [id number]  Referred-To Department    Referred-By Provider:  SUMMER Singletary   [facility] [address]      [address]  [place] [id number] [phone] [address] [facility] [id number] [phone]    Referral Start Date:  03/18/2025  Referral End Date:   03/18/2026             SCHEDULING  If you do not already have an appointment, please call [phone] to make an appointment.     MORE INFORMATION  If you do not already have a [employer] account, sign up at: [email or web address]  You can access your medical information, make appointments, see lab results, billing information, and more.  If you have questions regarding this referral, please contact  the [facility] Referrals department at:             [phone]. Monday - Friday 8:00AM - 5:00PM.     Sincerely,    [facility]

## 2025-05-12 ENCOUNTER — OFFICE VISIT (OUTPATIENT)
Dept: SLEEP MEDICINE | Facility: MEDICAL CENTER | Age: 45
End: 2025-05-12
Payer: COMMERCIAL

## 2025-05-12 VITALS
BODY MASS INDEX: 47.82 KG/M2 | WEIGHT: 287 LBS | OXYGEN SATURATION: 99 % | RESPIRATION RATE: 16 BRPM | DIASTOLIC BLOOD PRESSURE: 82 MMHG | HEART RATE: 69 BPM | SYSTOLIC BLOOD PRESSURE: 118 MMHG | HEIGHT: 65 IN

## 2025-05-12 DIAGNOSIS — G47.33 OSA (OBSTRUCTIVE SLEEP APNEA): ICD-10-CM

## 2025-05-12 PROCEDURE — 99214 OFFICE O/P EST MOD 30 MIN: CPT

## 2025-05-12 PROCEDURE — 99203 OFFICE O/P NEW LOW 30 MIN: CPT | Performed by: STUDENT IN AN ORGANIZED HEALTH CARE EDUCATION/TRAINING PROGRAM

## 2025-05-12 ASSESSMENT — FIBROSIS 4 INDEX: FIB4 SCORE: 0.64

## 2025-05-12 NOTE — PROGRESS NOTES
[facility] Consult Note     Date: 5/12/2025 / Time: 10:06 AM      Thank you for requesting a sleep medicine consultation on Ralf Qureshi at the sleep center. Presents today with the chief complaints of needing CPAP plus. She is referred by [facility]  [address] [place] [address] for evaluation and treatment of obstructive sleep apnea.     HISTORY OF PRESENT ILLNESS:     Ralf Qureshi is a 44 y.o. female with obesity, and obstructive sleep apnea on CPAP.  Presents to Sleep Clinic to reestablish care for management of obstructive sleep apnea.    She is a previous patient of our office last seen in 2020.    She continues to use her CPAP machine nightly.  Overall finds that CPAP helps her sleep.  With using CPAP she is more rested and has more energy throughout the day.  She does not get as tired after meals that she did previously.  She finds her mask and pressures comfortable.  She does report she is running low on supplies and needs a new supply prescription.    Her machine was replaced regarding the [employer] recall.  She did not bring the memory card to today's visit.    DME provider: [employer]  Device: Dreamstation replaced from recall   Mask: nasal mask   Aerophagia: No   Snoring: No   Dry mouth: Yes  Leak: No  Skin irritation: No   Chin strap: No       As per supplemental questionnaire to be scanned or imported into chart:    [place] Sleepiness Score: 3    Sleep Schedule  Bedtime: Weekday 10 pm  Weekend same   Wake time: Weekday 630am  Weekend 8am  Sleep-onset latency: 20-30 min   Awakenings from sleep: 1  Difficulty falling back asleep: no  Bedroom partner: yes   Naps: No     DAYTIME SYMPTOMS:   Excessive daytime sleepiness: No   Daytime fatigue: sometimes better with CPAP   Difficulty concentrating: yes   Memory problems: slight   Irritability:sometimes   Work/school performance issues: No   Sleepiness with driving: No   Caffeine/stimulant use:  "Yes  Alcohol use:Yes, How Many? rare     SLEEP RELATED SYMPTOMS  Snoring: without CPAP   Witnessed apnea or gasping/choking: No   Dry mouth or mouth breathing: Yes  Night Sweating: No   Teeth grinding/biting: Yes  Morning headaches: Yes less with machine   Refreshed Upon Awakening: sometimes      SLEEP RELATED BEHAVIORS:  Parasomnias (walking, talking, eating, violence): No   Leg kicking: No   Restless legs - \"urge to move\": No   Nightmares: No  Recurrent: No   Dream enactment: No      NARCOLEPSY:  Cataplexy: No   Sleep paralysis: No   Sleep attacks: No   Hypnagogic/hypnopompic hallucinations: No     MEDICAL HISTORY  Past Medical History:   Diagnosis Date    Anemia 02/01/2011    Blood transfusion 02/01/2011    2/2 anemia    Headache(784.0)     Migraine     Snoring         SURGICAL HISTORY  Past Surgical History:   Procedure Laterality Date    BEAR BY LAPAROSCOPY  02/15/2011    Performed by LALITA ALANIS at SURGERY [facility] ORS    DILATION AND CURETTAGE  01/01/2006    CHOLECYSTECTOMY          FAMILY HISTORY  Family History   Problem Relation Age of Onset    Cancer Mother         lung    Lung Cancer Mother     Stroke Mother     Other Father         unknown    Hypertension Brother     Diabetes Brother     Hyperlipidemia Maternal Grandmother     Diabetes Maternal Grandmother         insulin    Hypertension Maternal Grandmother     Cancer Maternal Grandfather         lung cancer    Colorectal Cancer Neg Hx     Dementia Neg Hx     Heart Disease Neg Hx        SOCIAL HISTORY  Social History     Socioeconomic History    Marital status:     Highest education level: Bachelor's degree (e.g., BA, AB, BS)   Tobacco Use    Smoking status: Some Days     Current packs/day: 0.00     Average packs/day: 0.3 packs/day for 24.0 years (6.0 ttl pk-yrs)     Types: Cigarettes     Start date: 10/8/1990     Last attempt to quit: 10/8/2014     Years since quitting: 10.6    Smokeless tobacco: Never    Tobacco comments:     cut down " to 3-4 cigarette per day   Vaping Use    Vaping status: Never Used   Substance and Sexual Activity    Alcohol use: Yes     Comment: occ, special occasions    Drug use: No    Sexual activity: Yes     Partners: Male     Birth control/protection: I.U.D.     Social Drivers of Health     Financial Resource Strain: Medium Risk (5/28/2024)    Overall Financial Resource Strain (CARDIA)     Difficulty of Paying Living Expenses: Somewhat hard   Food Insecurity: Food Insecurity Present (5/28/2024)    Hunger Vital Sign     Worried About Running Out of Food in the Last Year: Sometimes true     Ran Out of Food in the Last Year: Sometimes true   Transportation Needs: No Transportation Needs (5/28/2024)    PRAPARE - Transportation     Lack of Transportation (Medical): No     Lack of Transportation (Non-Medical): No   Physical Activity: Insufficiently Active (5/28/2024)    Exercise Vital Sign     Days of Exercise per Week: 1 day     Minutes of Exercise per Session: 60 min   Stress: Stress Concern Present (5/28/2024)    [place] [employer] - Occupational Stress Questionnaire     Feeling of Stress : To some extent   Social Connections: Socially Integrated (5/28/2024)    Social Connection and Isolation Panel [NHANES]     Frequency of Communication with Friends and Family: Twice a week     Frequency of Social Gatherings with Friends and Family: Once a week     Attends [employer]ze Services: More than 4 times per year     Active Member of Clubs or Organizations: Yes     Attends Club or Organization Meetings: More than 4 times per year     Marital Status:    Housing Stability: High Risk (5/28/2024)    Housing Stability Vital Sign     Unable to Pay for Housing in the Last Year: Yes     Number of Places Lived in the Last Year: 1     Unstable Housing in the Last Year: No        Occupation: [employer], works from home    CURRENT MEDICATIONS  Current Outpatient Medications   Medication Sig  "Dispense Refill    ergocalciferol (DRISDOL) 42879 UNIT capsule Take 1 Capsule by mouth every 7 days. 12 Capsule 3     No current facility-administered medications for this visit.       REVIEW OF SYSTEMS  Constitutional: Denies fevers, Denies weight changes  Ears/Nose/Throat/Mouth: Denies nasal congestion or sore throat   Cardiovascular: Denies chest pain  Respiratory: Denies shortness of breath, Denies cough  Gastrointestinal/Hepatic: Denies nausea, vomiting  Sleep: see HPI    Physical Examination:  Vitals/ General Appearance:   Weight/BMI: Body mass index is 47.76 kg/m².  /82 (BP Location: Left arm, Patient Position: Sitting, BP Cuff Size: Large adult)   Pulse 69   Resp 16   Ht 1.651 m (5' 5\")   Wt (!) 130 kg (287 lb)   SpO2 99%   Vitals:    05/12/25 1006   BP: 118/82   BP Location: Left arm   Patient Position: Sitting   BP Cuff Size: Large adult   Pulse: 69   Resp: 16   SpO2: 99%   Weight: (!) 130 kg (287 lb)   Height: 1.651 m (5' 5\")       Pt. is alert and oriented to time, place and person. Cooperative and in no apparent distress.     Constitutional: Alert, no distress, well-groomed.  Skin: No rashes in visible areas.  Eye: Round. Conjunctiva clear, lids normal. No icterus.   ENT EXAM  Nasal alae/valves collapsible: No   Nasal septum deviation: No   Nasal turbinate hypertrophy: No   Hard palate narrow: No   Hard palate high: No   Soft palate/uvula (Mallampati score): 4  Tongue Scalloping: Yes  Retrognathia: No   Micrognathia: No   Cardiovascular:no murmus/gallops/rubs, normal S1 and S2 heart sounds, regular rate and rhythm  Pulmonary:Clear to auscultation, No wheezes, No crackles.  Neurologic:Awake, alert and oriented x 3, Normal age appropriate gait, No involuntary motions.  Extremities: No clubbing, cyanosis, or edema       ASSESSMENT AND PLAN   Ralf Qureshi is a 44 y.o. female with obesity, and obstructive sleep apnea on CPAP.  Presents to Sleep Clinic to reestablish care for management " of obstructive sleep apnea.    1. Ralf Qureshi  has Obstructive Sleep Apnea (ZAHRA). Ralf Qureshi has symptoms of snoring, unrefreshed upon awakening, and daytime tiredness when not using CPAP. These did interfere with activities of daily living.     Pt has risk factors for ZAHRA include obesity, age, and crowded oropharynx.     The pathophysiology of ZAHRA and the increased risk of cardiovascular morbidity from untreated ZAHRA is discussed in detail with the patient.         The medical record was reviewed.    Reviewed previous HST and previous compliance reports.    We do not have access to her machine which was replaced with the [employer].  Advised patient to bring by her machine or memory card for download.    Pt continues to use and benefit from machine.        PLAN:   -Order placed for mask and supplies   -Advised to reach out via MyChart with questions     Has been advised to continue the current CPAP, clean equipment frequently, and get new mask and supplies as allowed by insurance and DME. Recommend an earlier appointment, if significant treatment barriers develop.    Patients with ZAHRA are at increased risk of cardiovascular disease including coronary artery disease, systemic arterial hypertension, pulmonary arterial hypertension, cardiac arrythmias, and stroke.     Return in about 6 months (around 11/12/2025).        Please note portions of this record was created using voice recognition software. I have made every reasonable attempt to correct obvious errors, but I expect that there are errors of grammar and possibly content I did not discover before finalizing the note.

## 2025-06-23 ENCOUNTER — APPOINTMENT (OUTPATIENT)
Dept: MEDICAL GROUP | Facility: PHYSICIAN GROUP | Age: 45
End: 2025-06-23
Payer: COMMERCIAL

## 2025-07-13 ENCOUNTER — APPOINTMENT (OUTPATIENT)
Dept: URGENT CARE | Facility: PHYSICIAN GROUP | Age: 45
End: 2025-07-13
Payer: COMMERCIAL